# Patient Record
Sex: FEMALE | Race: WHITE | NOT HISPANIC OR LATINO | URBAN - METROPOLITAN AREA
[De-identification: names, ages, dates, MRNs, and addresses within clinical notes are randomized per-mention and may not be internally consistent; named-entity substitution may affect disease eponyms.]

---

## 2019-11-06 ENCOUNTER — NEW PATIENT (OUTPATIENT)
Dept: URBAN - METROPOLITAN AREA CLINIC 19 | Facility: CLINIC | Age: 84
End: 2019-11-06

## 2019-11-06 DIAGNOSIS — H25.13: ICD-10-CM

## 2019-11-06 DIAGNOSIS — H43.393: ICD-10-CM

## 2019-11-06 PROCEDURE — 99204 OFFICE O/P NEW MOD 45 MIN: CPT

## 2019-11-06 PROCEDURE — 92134 CPTRZ OPH DX IMG PST SGM RTA: CPT

## 2019-11-06 PROCEDURE — 92225 OPHTHALMOSCOPY (INITIAL): CPT

## 2019-11-06 ASSESSMENT — TONOMETRY
OS_IOP_MMHG: 13
OD_IOP_MMHG: 14

## 2019-11-06 ASSESSMENT — VISUAL ACUITY
OS_SC: 20/30
OS_PH: 20/25
OD_SC: 20/50-1
OD_PH: 20/40-1

## 2022-09-12 ENCOUNTER — NURSE TRIAGE (OUTPATIENT)
Dept: OTHER | Facility: CLINIC | Age: 87
End: 2022-09-12

## 2022-09-12 NOTE — TELEPHONE ENCOUNTER
Received call from Le at [facility] with [employer] Complaint. Subjective: Caller states \"She has been having dizziness and fainting\"     Current Symptoms: 2 episodes of dizziness and falling. Vertigo. Some vomiting. Dropping things and unable to walk x1 time  Had a CT scan and that was normal. Has appt with neuro in 2 days. Getting more unsteady on her feet. Onset: 6 months ago; worsening    Associated Symptoms: reduced activity    Pain Severity: 0/10; ;     Temperature: denies fever     What has been tried:     LMP: NA Pregnant: NA    Recommended disposition: See HCP within 4 Hours (or PCP triage)    Care advice provided, patient verbalizes understanding; denies any other questions or concerns; instructed to call back for any new or worsening symptoms. Patient/Caller agrees with recommended disposition; writer provided warm transfer to [facility] at [facility] for appointment scheduling    Attention Provider: Thank you for allowing me to participate in the care of your patient. The patient was connected to triage in response to information provided to the [facility]. Please do not respond through this encounter as the response is not directed to a shared pool.     Reason for Disposition   Taking a medicine that could cause dizziness (e.g., phenytoin [Dilantin], carbamazepine [Tegretol], primidone [Mysoline])    Protocols used: Dizziness - Vertigo-ADULT-

## 2023-07-31 ENCOUNTER — HOSPITAL ENCOUNTER (OUTPATIENT)
Facility: HOSPITAL | Age: 88
Discharge: HOME | End: 2023-08-03
Attending: SPECIALIST
Payer: MEDICARE

## 2023-07-31 VITALS — WEIGHT: 129 LBS

## 2023-07-31 DIAGNOSIS — H81.01 MENIERE'S DISEASE OF RIGHT EAR: ICD-10-CM

## 2023-07-31 DIAGNOSIS — H90.3 SENSORINEURAL HEARING LOSS, BILATERAL: ICD-10-CM

## 2023-07-31 DIAGNOSIS — H93.13 TINNITUS, BILATERAL: ICD-10-CM

## 2023-07-31 PROCEDURE — A9579 GAD-BASE MR CONTRAST NOS,1ML: HCPCS | Performed by: RADIOLOGY

## 2023-07-31 PROCEDURE — 6360000004 HC RX CONTRAST MEDICATION: Performed by: RADIOLOGY

## 2023-07-31 PROCEDURE — 70553 MRI BRAIN STEM W/O & W/DYE: CPT

## 2023-07-31 RX ADMIN — GADOTERIDOL 11 ML: 279.3 INJECTION, SOLUTION INTRAVENOUS at 15:01

## 2024-03-18 ENCOUNTER — OFFICE VISIT (OUTPATIENT)
Age: 89
End: 2024-03-18

## 2024-03-18 VITALS
TEMPERATURE: 97.9 F | DIASTOLIC BLOOD PRESSURE: 68 MMHG | WEIGHT: 125 LBS | RESPIRATION RATE: 18 BRPM | HEART RATE: 69 BPM | BODY MASS INDEX: 21.34 KG/M2 | HEIGHT: 64 IN | OXYGEN SATURATION: 96 % | SYSTOLIC BLOOD PRESSURE: 182 MMHG

## 2024-03-18 DIAGNOSIS — B96.89 ACUTE BACTERIAL SINUSITIS: Primary | ICD-10-CM

## 2024-03-18 DIAGNOSIS — R03.0 ELEVATED BLOOD PRESSURE READING: ICD-10-CM

## 2024-03-18 DIAGNOSIS — J20.9 ACUTE BRONCHITIS, UNSPECIFIED ORGANISM: ICD-10-CM

## 2024-03-18 DIAGNOSIS — R09.89 CHEST CONGESTION: ICD-10-CM

## 2024-03-18 DIAGNOSIS — J01.90 ACUTE BACTERIAL SINUSITIS: Primary | ICD-10-CM

## 2024-03-18 DIAGNOSIS — R53.1 WEAKNESS GENERALIZED: ICD-10-CM

## 2024-03-18 LAB
Lab: NORMAL
PERFORMING INSTRUMENT: NORMAL
QC PASS/FAIL: NORMAL
SARS-COV-2, POC: NORMAL

## 2024-03-18 RX ORDER — METHYLPREDNISOLONE 4 MG/1
4 TABLET ORAL SEE ADMIN INSTRUCTIONS
Qty: 1 KIT | Refills: 0 | Status: SHIPPED | OUTPATIENT
Start: 2024-03-18

## 2024-03-18 RX ORDER — PHENOL 1.4 %
600 AEROSOL, SPRAY (ML) MUCOUS MEMBRANE DAILY
COMMUNITY

## 2024-03-18 RX ORDER — CARBAMAZEPINE 200 MG/1
1 TABLET ORAL 3 TIMES DAILY
COMMUNITY

## 2024-03-18 RX ORDER — AMLODIPINE BESYLATE 5 MG/1
TABLET ORAL
COMMUNITY
Start: 2022-12-19

## 2024-03-18 RX ORDER — SPIRONOLACTONE 25 MG/1
TABLET ORAL
COMMUNITY
Start: 2023-02-02

## 2024-03-18 RX ORDER — AZITHROMYCIN 250 MG/1
TABLET, FILM COATED ORAL
Qty: 6 TABLET | Refills: 0 | Status: SHIPPED | OUTPATIENT
Start: 2024-03-18 | End: 2024-03-28

## 2024-03-18 RX ORDER — LOSARTAN POTASSIUM 100 MG/1
100 TABLET ORAL DAILY
COMMUNITY
Start: 2022-09-25

## 2024-03-18 RX ORDER — BENZONATATE 200 MG/1
200 CAPSULE ORAL 3 TIMES DAILY PRN
Qty: 30 CAPSULE | Refills: 0 | Status: SHIPPED | OUTPATIENT
Start: 2024-03-18 | End: 2024-03-28

## 2024-03-18 RX ORDER — BETAHISTINE HCL 100 %
1 POWDER (GRAM) MISCELLANEOUS 2 TIMES DAILY
COMMUNITY
Start: 2024-01-22

## 2024-03-18 RX ORDER — ERGOCALCIFEROL 1.25 MG/1
CAPSULE ORAL
COMMUNITY
Start: 2022-09-25

## 2024-03-18 ASSESSMENT — ENCOUNTER SYMPTOMS
COUGH: 1
EYES NEGATIVE: 1
RHINORRHEA: 1
GASTROINTESTINAL NEGATIVE: 1
ALLERGIC/IMMUNOLOGIC NEGATIVE: 1

## 2024-03-18 NOTE — PROGRESS NOTES
3/18/2024   Makenzie Zepeda (: 1934) is a 89 y.o. female, New patient, here for evaluation of the following chief complaint(s):  Pneumonia (Hx of double pneumonia thinks it may possibly be pneumonia again from same sxs going on has coughing, congestion, weakness )     ASSESSMENT/PLAN:  Below is the assessment and plan developed based on review of pertinent history, physical exam, labs, studies, and medications.  1. Acute bacterial sinusitis  -     azithromycin (ZITHROMAX) 250 MG tablet; 500mg on day 1 followed by 250mg on days 2 - 5, Disp-6 tablet, R-0Normal  2. Acute bronchitis, unspecified organism  -     azithromycin (ZITHROMAX) 250 MG tablet; 500mg on day 1 followed by 250mg on days 2 - 5, Disp-6 tablet, R-0Normal  -     benzonatate (TESSALON) 200 MG capsule; Take 1 capsule by mouth 3 times daily as needed for Cough, Disp-30 capsule, R-0Normal  3. Weakness generalized  -     POCT COVID-19, Antigen  4. Chest congestion  -     XR CHEST PA/LAT; Future  5. Elevated blood pressure reading         Handout given with care instructions  2. OTC for symptom management. Increase fluid intake, ensure adequate nutritional intake.  3. Follow up with PCP as needed.  4. Go to ED with development of any acute symptoms.     Follow up:  Return in about 4 weeks (around 4/15/2024) for BP Check with medication change.  Follow up immediately for any new, worsening or changes or if symptoms are not improving over the next 5-7 days.     SUBJECTIVE/OBJECTIVE:  HPI     aMkenzie Zepeda is a 89 y.o. female who is accompanied by complains of congestion, post nasal drip, and cough described as productive of yellow sputum for 5 days. She denies a history of chest pain, dizziness, fatigue, fevers, myalgias, nausea, shortness of breath, and vomiting and denies a history of asthma.  Patient denies environmental/ seasonal allergies. Patient denies exposure to smoke / cigarettes.Patient denies exposure to  sick contacts . Patient also noted that

## 2024-03-18 NOTE — PATIENT INSTRUCTIONS
as acetaminophen (Tylenol), ibuprofen (Advil, Motrin),  to reduce fever and relieve body aches. Read and follow all instructions on the label.Use a humidifier in your room. Start OTC non drowsy antihistamine.

## 2024-06-27 ENCOUNTER — HOSPITAL ENCOUNTER (EMERGENCY)
Facility: HOSPITAL | Age: 89
Discharge: HOME | End: 2024-06-27
Attending: EMERGENCY MEDICINE
Payer: MEDICARE

## 2024-06-27 ENCOUNTER — APPOINTMENT (OUTPATIENT)
Facility: HOSPITAL | Age: 89
End: 2024-06-27
Payer: MEDICARE

## 2024-06-27 ENCOUNTER — APPOINTMENT (OUTPATIENT)
Facility: HOSPITAL | Age: 89
End: 2024-06-27
Attending: EMERGENCY MEDICINE
Payer: MEDICARE

## 2024-06-27 VITALS
TEMPERATURE: 97.5 F | SYSTOLIC BLOOD PRESSURE: 143 MMHG | HEIGHT: 64 IN | WEIGHT: 124 LBS | RESPIRATION RATE: 16 BRPM | HEART RATE: 65 BPM | OXYGEN SATURATION: 97 % | BODY MASS INDEX: 21.17 KG/M2 | DIASTOLIC BLOOD PRESSURE: 80 MMHG

## 2024-06-27 DIAGNOSIS — E86.0 DEHYDRATION: Primary | ICD-10-CM

## 2024-06-27 DIAGNOSIS — R11.2 NAUSEA AND VOMITING, UNSPECIFIED VOMITING TYPE: ICD-10-CM

## 2024-06-27 LAB
ALBUMIN SERPL-MCNC: 3.2 G/DL (ref 3.5–5)
ALBUMIN/GLOB SERPL: 1.1 (ref 1.1–2.2)
ALP SERPL-CCNC: 77 U/L (ref 45–117)
ALT SERPL-CCNC: 15 U/L (ref 12–78)
ANION GAP SERPL CALC-SCNC: 7 MMOL/L (ref 5–15)
APPEARANCE UR: CLEAR
AST SERPL-CCNC: 13 U/L (ref 15–37)
BACTERIA URNS QL MICRO: NEGATIVE /HPF
BASOPHILS # BLD: 0 K/UL (ref 0–0.1)
BASOPHILS NFR BLD: 1 % (ref 0–1)
BILIRUB SERPL-MCNC: 0.6 MG/DL (ref 0.2–1)
BILIRUB UR QL: NEGATIVE
BUN SERPL-MCNC: 37 MG/DL (ref 6–20)
BUN/CREAT SERPL: 27 (ref 12–20)
CALCIUM SERPL-MCNC: 8.8 MG/DL (ref 8.5–10.1)
CHLORIDE SERPL-SCNC: 100 MMOL/L (ref 97–108)
CO2 SERPL-SCNC: 23 MMOL/L (ref 21–32)
COLOR UR: NORMAL
CREAT SERPL-MCNC: 1.39 MG/DL (ref 0.55–1.02)
DIFFERENTIAL METHOD BLD: ABNORMAL
EOSINOPHIL # BLD: 0 K/UL (ref 0–0.4)
EOSINOPHIL NFR BLD: 0 % (ref 0–7)
EPITH CASTS URNS QL MICRO: NORMAL /LPF
ERYTHROCYTE [DISTWIDTH] IN BLOOD BY AUTOMATED COUNT: 11.2 % (ref 11.5–14.5)
GLOBULIN SER CALC-MCNC: 2.9 G/DL (ref 2–4)
GLUCOSE SERPL-MCNC: 125 MG/DL (ref 65–100)
GLUCOSE UR STRIP.AUTO-MCNC: NEGATIVE MG/DL
HCT VFR BLD AUTO: 37.8 % (ref 35–47)
HGB BLD-MCNC: 13.2 G/DL (ref 11.5–16)
HGB UR QL STRIP: NEGATIVE
HYALINE CASTS URNS QL MICRO: NORMAL /LPF (ref 0–2)
IMM GRANULOCYTES # BLD AUTO: 0 K/UL (ref 0–0.04)
IMM GRANULOCYTES NFR BLD AUTO: 0 % (ref 0–0.5)
KETONES UR QL STRIP.AUTO: NEGATIVE MG/DL
LEUKOCYTE ESTERASE UR QL STRIP.AUTO: NEGATIVE
LIPASE SERPL-CCNC: 56 U/L (ref 13–75)
LYMPHOCYTES # BLD: 1.2 K/UL (ref 0.8–3.5)
LYMPHOCYTES NFR BLD: 14 % (ref 12–49)
MAGNESIUM SERPL-MCNC: 2.1 MG/DL (ref 1.6–2.4)
MCH RBC QN AUTO: 34.6 PG (ref 26–34)
MCHC RBC AUTO-ENTMCNC: 34.9 G/DL (ref 30–36.5)
MCV RBC AUTO: 99.2 FL (ref 80–99)
MONOCYTES # BLD: 0.6 K/UL (ref 0–1)
MONOCYTES NFR BLD: 7 % (ref 5–13)
NEUTS SEG # BLD: 6.9 K/UL (ref 1.8–8)
NEUTS SEG NFR BLD: 78 % (ref 32–75)
NITRITE UR QL STRIP.AUTO: NEGATIVE
NRBC # BLD: 0 K/UL (ref 0–0.01)
NRBC BLD-RTO: 0 PER 100 WBC
PH UR STRIP: 5 (ref 5–8)
PLATELET # BLD AUTO: 218 K/UL (ref 150–400)
PMV BLD AUTO: 10.2 FL (ref 8.9–12.9)
POTASSIUM SERPL-SCNC: 3.8 MMOL/L (ref 3.5–5.1)
PROT SERPL-MCNC: 6.1 G/DL (ref 6.4–8.2)
PROT UR STRIP-MCNC: NEGATIVE MG/DL
RBC # BLD AUTO: 3.81 M/UL (ref 3.8–5.2)
RBC #/AREA URNS HPF: NORMAL /HPF (ref 0–5)
SODIUM SERPL-SCNC: 130 MMOL/L (ref 136–145)
SP GR UR REFRACTOMETRY: 1.01 (ref 1–1.03)
UROBILINOGEN UR QL STRIP.AUTO: 0.2 EU/DL (ref 0.2–1)
WBC # BLD AUTO: 8.8 K/UL (ref 3.6–11)
WBC URNS QL MICRO: NORMAL /HPF (ref 0–4)

## 2024-06-27 PROCEDURE — 81001 URINALYSIS AUTO W/SCOPE: CPT

## 2024-06-27 PROCEDURE — 6360000002 HC RX W HCPCS: Performed by: EMERGENCY MEDICINE

## 2024-06-27 PROCEDURE — 96374 THER/PROPH/DIAG INJ IV PUSH: CPT

## 2024-06-27 PROCEDURE — 36415 COLL VENOUS BLD VENIPUNCTURE: CPT

## 2024-06-27 PROCEDURE — 71250 CT THORAX DX C-: CPT

## 2024-06-27 PROCEDURE — 80053 COMPREHEN METABOLIC PANEL: CPT

## 2024-06-27 PROCEDURE — 99285 EMERGENCY DEPT VISIT HI MDM: CPT

## 2024-06-27 PROCEDURE — 85025 COMPLETE CBC W/AUTO DIFF WBC: CPT

## 2024-06-27 PROCEDURE — 71045 X-RAY EXAM CHEST 1 VIEW: CPT

## 2024-06-27 PROCEDURE — 2580000003 HC RX 258: Performed by: EMERGENCY MEDICINE

## 2024-06-27 PROCEDURE — 96361 HYDRATE IV INFUSION ADD-ON: CPT

## 2024-06-27 PROCEDURE — 93005 ELECTROCARDIOGRAM TRACING: CPT | Performed by: EMERGENCY MEDICINE

## 2024-06-27 PROCEDURE — 83735 ASSAY OF MAGNESIUM: CPT

## 2024-06-27 PROCEDURE — 83690 ASSAY OF LIPASE: CPT

## 2024-06-27 RX ORDER — ONDANSETRON 2 MG/ML
4 INJECTION INTRAMUSCULAR; INTRAVENOUS
Status: COMPLETED | OUTPATIENT
Start: 2024-06-27 | End: 2024-06-27

## 2024-06-27 RX ORDER — ONDANSETRON 4 MG/1
4 TABLET, ORALLY DISINTEGRATING ORAL 3 TIMES DAILY PRN
Qty: 21 TABLET | Refills: 0 | Status: SHIPPED | OUTPATIENT
Start: 2024-06-27

## 2024-06-27 RX ORDER — 0.9 % SODIUM CHLORIDE 0.9 %
1000 INTRAVENOUS SOLUTION INTRAVENOUS ONCE
Status: COMPLETED | OUTPATIENT
Start: 2024-06-27 | End: 2024-06-27

## 2024-06-27 RX ADMIN — SODIUM CHLORIDE 1000 ML: 9 INJECTION, SOLUTION INTRAVENOUS at 18:02

## 2024-06-27 RX ADMIN — ONDANSETRON 4 MG: 2 INJECTION INTRAMUSCULAR; INTRAVENOUS at 18:02

## 2024-06-27 ASSESSMENT — ENCOUNTER SYMPTOMS
COUGH: 0
SORE THROAT: 0
VOMITING: 0

## 2024-06-27 ASSESSMENT — LIFESTYLE VARIABLES
HOW MANY STANDARD DRINKS CONTAINING ALCOHOL DO YOU HAVE ON A TYPICAL DAY: PATIENT DOES NOT DRINK
HOW OFTEN DO YOU HAVE A DRINK CONTAINING ALCOHOL: NEVER

## 2024-06-27 ASSESSMENT — PAIN - FUNCTIONAL ASSESSMENT: PAIN_FUNCTIONAL_ASSESSMENT: 0-10

## 2024-06-27 ASSESSMENT — PAIN SCALES - GENERAL: PAINLEVEL_OUTOF10: 0

## 2024-06-27 NOTE — ED TRIAGE NOTES
Pt arrives to ED via EMS from [facility] where pt reports vomiting x4 days (twice today) and generalized weakness.  Pt denies diarrhea, cough, CP, SOB, fevers.  Pt has been eating and drinking, but is unable to keep \"anything down\".  Pt denies pain.

## 2024-06-28 LAB
EKG ATRIAL RATE: 67 BPM
EKG DIAGNOSIS: NORMAL
EKG P AXIS: 49 DEGREES
EKG P-R INTERVAL: 192 MS
EKG Q-T INTERVAL: 394 MS
EKG QRS DURATION: 98 MS
EKG QTC CALCULATION (BAZETT): 416 MS
EKG R AXIS: 3 DEGREES
EKG T AXIS: 72 DEGREES
EKG VENTRICULAR RATE: 67 BPM

## 2024-06-28 PROCEDURE — 93010 ELECTROCARDIOGRAM REPORT: CPT | Performed by: INTERNAL MEDICINE

## 2024-06-28 NOTE — ED PROVIDER NOTES
[facility] EMERGENCY DEPT  EMERGENCY DEPARTMENT ENCOUNTER      Pt Name: Makenzie Zepeda  MRN: [id number]  Birthdate 12/13/1934  Date of evaluation: 6/27/2024  Provider: Joni Viera MD    CHIEF COMPLAINT       Chief Complaint   Patient presents with    Emesis    Generalized Body Aches         HISTORY OF PRESENT ILLNESS   (Location/Symptom, Timing/Onset, Context/Setting, Quality, Duration, Modifying Factors, Severity)  Note limiting factors.   Patient arrives via EMS from [facility] with reports of vomiting for the past 4 days.  Does report some generalized weakness.  I spoke to the patient's daughter who is also at the bedside who states she was recently treated for an upper respiratory infection with antibiotics but has not seemed to bounce back as she normally would.  Has had no appetite and decreased oral intake over the past few days.  No fever or urinary symptoms.    The history is provided by the patient and the EMS personnel.         Review of External Medical Records:     Nursing Notes were reviewed.    REVIEW OF SYSTEMS    (2-9 systems for level 4, 10 or more for level 5)     Review of Systems   Constitutional:  Negative for fatigue.   HENT:  Negative for sore throat.    Eyes:  Negative for visual disturbance.   Respiratory:  Negative for cough.    Cardiovascular:  Negative for palpitations.   Gastrointestinal:  Negative for vomiting.   Genitourinary:  Negative for difficulty urinating.   Musculoskeletal:  Negative for myalgias.   Skin:  Negative for rash.   Neurological:  Negative for weakness.       Except as noted above the remainder of the review of systems was reviewed and negative.       PAST MEDICAL HISTORY   No past medical history on file.      SURGICAL HISTORY     No past surgical history on file.      CURRENT MEDICATIONS       Previous Medications    AMLODIPINE (NORVASC) 5 MG TABLET        BETAHISTINE HCL (BETAHISTINE DIHYDROCHLORIDE) POWD    Take 1 capsule by mouth 2 times daily    CALCIUM  CARBONATE 600 MG TABS TABLET    Take 1 tablet by mouth daily    CARBAMAZEPINE (TEGRETOL) 200 MG TABLET    Take 1 tablet by mouth 3 times daily    CYANOCOBALAMIN 50 MCG TABLET    Take 1 tablet by mouth daily    LOSARTAN (COZAAR) 100 MG TABLET    Take 1 tablet by mouth daily    METHYLPREDNISOLONE (MEDROL, SHIRIN,) 4 MG TABLET    Take 1 tablet by mouth See Admin Instructions Take by mouth.    SPIRONOLACTONE (ALDACTONE) 25 MG TABLET        VITAMIN D (ERGOCALCIFEROL) 1.25 MG (24347 UT) CAPS CAPSULE    TAKE 1 CAPSULE BY MOUTH WEEKLY INDICATIONS: VITAMIN D DEFICIENCY       ALLERGIES     Nitrofurantoin, Lamotrigine, and Penicillins    FAMILY HISTORY     No family history on file.       SOCIAL HISTORY       Social History     Socioeconomic History    Marital status: Single   Tobacco Use    Smoking status: Never    Smokeless tobacco: Never           PHYSICAL EXAM    (up to 7 for level 4, 8 or more for level 5)     ED Triage Vitals [06/27/24 1732]   BP Temp Temp Source Pulse Respirations SpO2 Height Weight - Scale   (!) 143/80 97.5 °F (36.4 °C) Oral 65 16 97 % 1.626 m (5' 4\") 56.2 kg (124 lb)       Body mass index is 21.28 kg/m².    Physical Exam  HENT:      Head: Normocephalic.      Mouth/Throat:      Mouth: Mucous membranes are moist.   Eyes:      General: No scleral icterus.  Cardiovascular:      Rate and Rhythm: Normal rate.   Pulmonary:      Effort: Pulmonary effort is normal.   Abdominal:      General: There is no distension.   Musculoskeletal:         General: No deformity.      Cervical back: Neck supple.   Skin:     Findings: No rash.   Neurological:      General: No focal deficit present.      Mental Status: She is alert and oriented to person, place, and time.         DIAGNOSTIC RESULTS     EKG: All EKG's are interpreted by the Emergency Department Physician who either signs or Co-signs this chart in the absence of a cardiologist.        RADIOLOGY:   Non-plain film images such as CT, Ultrasound and MRI are read by the

## 2025-03-06 ENCOUNTER — APPOINTMENT (OUTPATIENT)
Facility: HOSPITAL | Age: 89
DRG: 193 | End: 2025-03-06
Payer: MEDICARE

## 2025-03-06 ENCOUNTER — HOSPITAL ENCOUNTER (INPATIENT)
Facility: HOSPITAL | Age: 89
LOS: 6 days | Discharge: NURSING, REHAB OR LONG-TERM CARE | DRG: 193 | End: 2025-03-12
Attending: EMERGENCY MEDICINE | Admitting: FAMILY MEDICINE
Payer: MEDICARE

## 2025-03-06 DIAGNOSIS — J96.01 ACUTE HYPOXIC RESPIRATORY FAILURE: Primary | ICD-10-CM

## 2025-03-06 PROBLEM — J15.8 PNEUMONIA DUE TO OTHER SPECIFIED BACTERIA (HCC): Status: ACTIVE | Noted: 2025-03-06

## 2025-03-06 LAB
ALBUMIN SERPL-MCNC: 3.7 G/DL (ref 3.5–5)
ALBUMIN/GLOB SERPL: 1.2 (ref 1.1–2.2)
ALP SERPL-CCNC: 36 U/L (ref 45–117)
ALT SERPL-CCNC: 19 U/L (ref 12–78)
ANION GAP SERPL CALC-SCNC: 8 MMOL/L (ref 2–12)
APPEARANCE UR: CLEAR
AST SERPL-CCNC: 17 U/L (ref 15–37)
BACTERIA URNS QL MICRO: NEGATIVE /HPF
BASOPHILS # BLD: 0 K/UL (ref 0–0.1)
BASOPHILS NFR BLD: 0 % (ref 0–1)
BILIRUB SERPL-MCNC: 0.7 MG/DL (ref 0.2–1)
BILIRUB UR QL CFM: POSITIVE
BUN SERPL-MCNC: 22 MG/DL (ref 6–20)
BUN/CREAT SERPL: 16 (ref 12–20)
CALCIUM SERPL-MCNC: 9.5 MG/DL (ref 8.5–10.1)
CHLORIDE SERPL-SCNC: 92 MMOL/L (ref 97–108)
CO2 SERPL-SCNC: 27 MMOL/L (ref 21–32)
COLOR UR: ABNORMAL
COMMENT:: NORMAL
CREAT SERPL-MCNC: 1.37 MG/DL (ref 0.55–1.02)
DIFFERENTIAL METHOD BLD: ABNORMAL
EOSINOPHIL # BLD: 0 K/UL (ref 0–0.4)
EOSINOPHIL NFR BLD: 0 % (ref 0–7)
EPITH CASTS URNS QL MICRO: ABNORMAL /LPF
ERYTHROCYTE [DISTWIDTH] IN BLOOD BY AUTOMATED COUNT: 11.6 % (ref 11.5–14.5)
FLUAV RNA SPEC QL NAA+PROBE: NOT DETECTED
FLUBV RNA SPEC QL NAA+PROBE: NOT DETECTED
GLOBULIN SER CALC-MCNC: 3.2 G/DL (ref 2–4)
GLUCOSE SERPL-MCNC: 132 MG/DL (ref 65–100)
GLUCOSE UR STRIP.AUTO-MCNC: NEGATIVE MG/DL
HCT VFR BLD AUTO: 35.4 % (ref 35–47)
HGB BLD-MCNC: 12.9 G/DL (ref 11.5–16)
HGB UR QL STRIP: NEGATIVE
HYALINE CASTS URNS QL MICRO: ABNORMAL /LPF (ref 0–2)
IMM GRANULOCYTES # BLD AUTO: 0 K/UL
IMM GRANULOCYTES NFR BLD AUTO: 0 %
KETONES UR QL STRIP.AUTO: ABNORMAL MG/DL
LEUKOCYTE ESTERASE UR QL STRIP.AUTO: ABNORMAL
LYMPHOCYTES # BLD: 0.8 K/UL (ref 0.8–3.5)
LYMPHOCYTES NFR BLD: 8 % (ref 12–49)
MCH RBC QN AUTO: 36.2 PG (ref 26–34)
MCHC RBC AUTO-ENTMCNC: 36.4 G/DL (ref 30–36.5)
MCV RBC AUTO: 99.4 FL (ref 80–99)
METAMYELOCYTES NFR BLD MANUAL: 1 %
MONOCYTES # BLD: 0.6 K/UL (ref 0–1)
MONOCYTES NFR BLD: 6 % (ref 5–13)
MYELOCYTES NFR BLD MANUAL: 1 %
NEUTS BAND NFR BLD MANUAL: 15 % (ref 0–6)
NEUTS SEG # BLD: 8.4 K/UL (ref 1.8–8)
NEUTS SEG NFR BLD: 69 % (ref 32–75)
NITRITE UR QL STRIP.AUTO: NEGATIVE
NRBC # BLD: 0 K/UL (ref 0–0.01)
NRBC BLD-RTO: 0 PER 100 WBC
NT PRO BNP: 1561 PG/ML
PH UR STRIP: 5 (ref 5–8)
PLATELET # BLD AUTO: 268 K/UL (ref 150–400)
PMV BLD AUTO: 9.5 FL (ref 8.9–12.9)
POTASSIUM SERPL-SCNC: 4.2 MMOL/L (ref 3.5–5.1)
PROT SERPL-MCNC: 6.9 G/DL (ref 6.4–8.2)
PROT UR STRIP-MCNC: 30 MG/DL
RBC # BLD AUTO: 3.56 M/UL (ref 3.8–5.2)
RBC #/AREA URNS HPF: ABNORMAL /HPF (ref 0–5)
RBC MORPH BLD: ABNORMAL
SARS-COV-2 RNA RESP QL NAA+PROBE: NOT DETECTED
SODIUM SERPL-SCNC: 127 MMOL/L (ref 136–145)
SOURCE: NORMAL
SP GR UR REFRACTOMETRY: 1.02 (ref 1–1.03)
SPECIMEN HOLD: NORMAL
TROPONIN I SERPL HS-MCNC: 8 NG/L (ref 0–51)
URINE CULTURE IF INDICATED: ABNORMAL
UROBILINOGEN UR QL STRIP.AUTO: 0.2 EU/DL (ref 0.2–1)
WBC # BLD AUTO: 10 K/UL (ref 3.6–11)
WBC URNS QL MICRO: ABNORMAL /HPF (ref 0–4)

## 2025-03-06 PROCEDURE — 6360000004 HC RX CONTRAST MEDICATION: Performed by: RADIOLOGY

## 2025-03-06 PROCEDURE — 6360000002 HC RX W HCPCS: Performed by: FAMILY MEDICINE

## 2025-03-06 PROCEDURE — 71275 CT ANGIOGRAPHY CHEST: CPT

## 2025-03-06 PROCEDURE — 83880 ASSAY OF NATRIURETIC PEPTIDE: CPT

## 2025-03-06 PROCEDURE — 81001 URINALYSIS AUTO W/SCOPE: CPT

## 2025-03-06 PROCEDURE — 2580000003 HC RX 258: Performed by: EMERGENCY MEDICINE

## 2025-03-06 PROCEDURE — 2580000003 HC RX 258

## 2025-03-06 PROCEDURE — 93005 ELECTROCARDIOGRAM TRACING: CPT

## 2025-03-06 PROCEDURE — 70450 CT HEAD/BRAIN W/O DYE: CPT

## 2025-03-06 PROCEDURE — 1100000000 HC RM PRIVATE

## 2025-03-06 PROCEDURE — 2500000003 HC RX 250 WO HCPCS: Performed by: EMERGENCY MEDICINE

## 2025-03-06 PROCEDURE — 36415 COLL VENOUS BLD VENIPUNCTURE: CPT

## 2025-03-06 PROCEDURE — 87449 NOS EACH ORGANISM AG IA: CPT

## 2025-03-06 PROCEDURE — 73030 X-RAY EXAM OF SHOULDER: CPT

## 2025-03-06 PROCEDURE — 2580000003 HC RX 258: Performed by: FAMILY MEDICINE

## 2025-03-06 PROCEDURE — 84484 ASSAY OF TROPONIN QUANT: CPT

## 2025-03-06 PROCEDURE — 80053 COMPREHEN METABOLIC PANEL: CPT

## 2025-03-06 PROCEDURE — 87636 SARSCOV2 & INF A&B AMP PRB: CPT

## 2025-03-06 PROCEDURE — 6360000002 HC RX W HCPCS: Performed by: EMERGENCY MEDICINE

## 2025-03-06 PROCEDURE — 71045 X-RAY EXAM CHEST 1 VIEW: CPT

## 2025-03-06 PROCEDURE — 99285 EMERGENCY DEPT VISIT HI MDM: CPT

## 2025-03-06 PROCEDURE — 85025 COMPLETE CBC W/AUTO DIFF WBC: CPT

## 2025-03-06 RX ORDER — SODIUM CHLORIDE 0.9 % (FLUSH) 0.9 %
5-40 SYRINGE (ML) INJECTION EVERY 12 HOURS SCHEDULED
Status: DISCONTINUED | OUTPATIENT
Start: 2025-03-06 | End: 2025-03-12 | Stop reason: HOSPADM

## 2025-03-06 RX ORDER — AMLODIPINE BESYLATE 5 MG/1
10 TABLET ORAL DAILY
Status: DISCONTINUED | OUTPATIENT
Start: 2025-03-07 | End: 2025-03-12 | Stop reason: HOSPADM

## 2025-03-06 RX ORDER — HYDRALAZINE HYDROCHLORIDE 25 MG/1
25 TABLET, FILM COATED ORAL EVERY 8 HOURS SCHEDULED
Status: DISCONTINUED | OUTPATIENT
Start: 2025-03-06 | End: 2025-03-12 | Stop reason: HOSPADM

## 2025-03-06 RX ORDER — HYDRALAZINE HYDROCHLORIDE 20 MG/ML
10 INJECTION INTRAMUSCULAR; INTRAVENOUS EVERY 6 HOURS PRN
Status: DISCONTINUED | OUTPATIENT
Start: 2025-03-06 | End: 2025-03-06

## 2025-03-06 RX ORDER — HYDRALAZINE HYDROCHLORIDE 25 MG/1
25 TABLET, FILM COATED ORAL EVERY 8 HOURS SCHEDULED
Status: DISCONTINUED | OUTPATIENT
Start: 2025-03-06 | End: 2025-03-06

## 2025-03-06 RX ORDER — ENOXAPARIN SODIUM 100 MG/ML
30 INJECTION SUBCUTANEOUS EVERY 24 HOURS
Status: DISCONTINUED | OUTPATIENT
Start: 2025-03-06 | End: 2025-03-07

## 2025-03-06 RX ORDER — HYDRALAZINE HYDROCHLORIDE 25 MG/1
25 TABLET, FILM COATED ORAL 2 TIMES DAILY
COMMUNITY
Start: 2025-02-10

## 2025-03-06 RX ORDER — LOSARTAN POTASSIUM 50 MG/1
100 TABLET ORAL DAILY
Status: DISCONTINUED | OUTPATIENT
Start: 2025-03-06 | End: 2025-03-06

## 2025-03-06 RX ORDER — ACETAMINOPHEN 650 MG/1
650 SUPPOSITORY RECTAL EVERY 6 HOURS PRN
Status: DISCONTINUED | OUTPATIENT
Start: 2025-03-06 | End: 2025-03-12 | Stop reason: HOSPADM

## 2025-03-06 RX ORDER — BACLOFEN 5 MG/1
5 TABLET ORAL DAILY
COMMUNITY
Start: 2025-02-16

## 2025-03-06 RX ORDER — SODIUM CHLORIDE 9 MG/ML
INJECTION, SOLUTION INTRAVENOUS CONTINUOUS
Status: DISCONTINUED | OUTPATIENT
Start: 2025-03-06 | End: 2025-03-07

## 2025-03-06 RX ORDER — SODIUM CHLORIDE 0.9 % (FLUSH) 0.9 %
5-40 SYRINGE (ML) INJECTION PRN
Status: DISCONTINUED | OUTPATIENT
Start: 2025-03-06 | End: 2025-03-12 | Stop reason: HOSPADM

## 2025-03-06 RX ORDER — ONDANSETRON 2 MG/ML
4 INJECTION INTRAMUSCULAR; INTRAVENOUS EVERY 6 HOURS PRN
Status: DISCONTINUED | OUTPATIENT
Start: 2025-03-06 | End: 2025-03-12 | Stop reason: HOSPADM

## 2025-03-06 RX ORDER — SODIUM CHLORIDE 9 MG/ML
INJECTION, SOLUTION INTRAVENOUS PRN
Status: DISCONTINUED | OUTPATIENT
Start: 2025-03-06 | End: 2025-03-12 | Stop reason: HOSPADM

## 2025-03-06 RX ORDER — LOSARTAN POTASSIUM 50 MG/1
100 TABLET ORAL DAILY
Status: DISCONTINUED | OUTPATIENT
Start: 2025-03-07 | End: 2025-03-12 | Stop reason: HOSPADM

## 2025-03-06 RX ORDER — AMLODIPINE BESYLATE 5 MG/1
10 TABLET ORAL DAILY
Status: DISCONTINUED | OUTPATIENT
Start: 2025-03-06 | End: 2025-03-06

## 2025-03-06 RX ORDER — IPRATROPIUM BROMIDE AND ALBUTEROL SULFATE 2.5; .5 MG/3ML; MG/3ML
1 SOLUTION RESPIRATORY (INHALATION) EVERY 4 HOURS PRN
Status: DISCONTINUED | OUTPATIENT
Start: 2025-03-06 | End: 2025-03-12 | Stop reason: HOSPADM

## 2025-03-06 RX ORDER — DOXYCYCLINE HYCLATE 100 MG
100 TABLET ORAL EVERY 12 HOURS SCHEDULED
Status: DISCONTINUED | OUTPATIENT
Start: 2025-03-07 | End: 2025-03-11

## 2025-03-06 RX ORDER — 0.9 % SODIUM CHLORIDE 0.9 %
1000 INTRAVENOUS SOLUTION INTRAVENOUS ONCE
Status: DISCONTINUED | OUTPATIENT
Start: 2025-03-06 | End: 2025-03-06

## 2025-03-06 RX ORDER — SERTRALINE HYDROCHLORIDE 25 MG/1
25 TABLET, FILM COATED ORAL DAILY
COMMUNITY
Start: 2025-01-03

## 2025-03-06 RX ORDER — POLYETHYLENE GLYCOL 3350 17 G/17G
17 POWDER, FOR SOLUTION ORAL DAILY PRN
Status: DISCONTINUED | OUTPATIENT
Start: 2025-03-06 | End: 2025-03-12 | Stop reason: HOSPADM

## 2025-03-06 RX ORDER — SPIRONOLACTONE 25 MG/1
50 TABLET ORAL DAILY
Status: DISCONTINUED | OUTPATIENT
Start: 2025-03-06 | End: 2025-03-06

## 2025-03-06 RX ORDER — ONDANSETRON 4 MG/1
4 TABLET, ORALLY DISINTEGRATING ORAL EVERY 8 HOURS PRN
Status: DISCONTINUED | OUTPATIENT
Start: 2025-03-06 | End: 2025-03-12 | Stop reason: HOSPADM

## 2025-03-06 RX ORDER — HYDRALAZINE HYDROCHLORIDE 20 MG/ML
10 INJECTION INTRAMUSCULAR; INTRAVENOUS EVERY 6 HOURS PRN
Status: DISCONTINUED | OUTPATIENT
Start: 2025-03-06 | End: 2025-03-12 | Stop reason: HOSPADM

## 2025-03-06 RX ORDER — ACETAMINOPHEN 325 MG/1
650 TABLET ORAL EVERY 6 HOURS PRN
Status: DISCONTINUED | OUTPATIENT
Start: 2025-03-06 | End: 2025-03-12 | Stop reason: HOSPADM

## 2025-03-06 RX ORDER — IOPAMIDOL 755 MG/ML
100 INJECTION, SOLUTION INTRAVASCULAR
Status: COMPLETED | OUTPATIENT
Start: 2025-03-06 | End: 2025-03-06

## 2025-03-06 RX ORDER — HYDROCHLOROTHIAZIDE 12.5 MG/1
12.5 TABLET ORAL DAILY
Status: ON HOLD | COMMUNITY
Start: 2025-02-08 | End: 2025-03-12 | Stop reason: HOSPADM

## 2025-03-06 RX ADMIN — ONDANSETRON 4 MG: 2 INJECTION, SOLUTION INTRAMUSCULAR; INTRAVENOUS at 17:43

## 2025-03-06 RX ADMIN — WATER 1000 MG: 1 INJECTION INTRAMUSCULAR; INTRAVENOUS; SUBCUTANEOUS at 15:50

## 2025-03-06 RX ADMIN — DOXYCYCLINE 100 MG: 100 INJECTION, POWDER, LYOPHILIZED, FOR SOLUTION INTRAVENOUS at 16:02

## 2025-03-06 RX ADMIN — SODIUM CHLORIDE: 0.9 INJECTION, SOLUTION INTRAVENOUS at 17:21

## 2025-03-06 RX ADMIN — SODIUM CHLORIDE 1000 ML: 0.9 INJECTION, SOLUTION INTRAVENOUS at 10:10

## 2025-03-06 RX ADMIN — METHYLPREDNISOLONE SODIUM SUCCINATE 60 MG: 125 INJECTION, POWDER, LYOPHILIZED, FOR SOLUTION INTRAMUSCULAR; INTRAVENOUS at 15:51

## 2025-03-06 RX ADMIN — IOPAMIDOL 65 ML: 755 INJECTION, SOLUTION INTRAVENOUS at 13:37

## 2025-03-06 RX ADMIN — HYDRALAZINE HYDROCHLORIDE 10 MG: 20 INJECTION INTRAMUSCULAR; INTRAVENOUS at 17:53

## 2025-03-06 RX ADMIN — ENOXAPARIN SODIUM 30 MG: 100 INJECTION SUBCUTANEOUS at 21:33

## 2025-03-06 ASSESSMENT — PAIN - FUNCTIONAL ASSESSMENT: PAIN_FUNCTIONAL_ASSESSMENT: NONE - DENIES PAIN

## 2025-03-06 NOTE — ACP (ADVANCE CARE PLANNING)
Advance Care Planning     Advance Care Planning (ACP) Physician/NP/PA Conversation    Date of Conversation: 3/6/2025  Conducted with: Legal next of kin  Other persons present: Daughter      Healthcare Decision Maker: No healthcare decision makers have been documented.         Care Preferences:    Hospitalization:  \"If your health worsens and it becomes clear that your chance of recovery is unlikely, what would be your preference regarding hospitalization?\"  The patient would prefer hospitalization.    Ventilation:  \"If you were unable to breath on your own and your chance of recovery was unlikely, what would be your preference about the use of a ventilator (breathing machine) if it was available to you?\"  The patient would NOT desire the use of a ventilator.    Resuscitation:  \"In the event your heart stopped as a result of an underlying serious health condition, would you want attempts made to restart your heart, or would you prefer a natural death?\"  No, do NOT attempt to resuscitate.    benefit/burden of treatment options    Conversation Outcomes / Follow-Up Plan:  ACP complete - no further action today  Reviewed DNR/DNI and patient confirms current DNR status - completed forms on file (place new order if needed)    Length of Voluntary ACP Conversation in minutes:  20 minutes    Faina Gregory MD

## 2025-03-06 NOTE — ED TRIAGE NOTES
Patient arrives via EMS from [facility].     Patient had a GLF on Monday. Reports last night she started with a cough and vomiting. Today she is weaker than normal.     86% on RA with EMS. 94% on 4L NC O2. Not typically on home O2.     Patient reports no complaints.     PMH HTN, Dementia.

## 2025-03-06 NOTE — H&P
[facility]  [address] [place] [address] [phone]    [facility] Adult  Hospitalist Group    History & Physical    Date of service: 3/6/2025    Patient name: Makenzie Zepeda  MRN: [id number]  YOB: 1934  Age: 89 y.o.     Primary care provider:  Joni Roberts MD     Source of Information: patient, medical records                                Chief complain: Fatigue    History of present illness  Makenzie Zepeda is a 89 y.o. female who presented with fatigue.  She apparently fell last Monday, denies any head trauma but is not sure if she lost consciousness.  She has been feeling fatigued and started having a cough with vomiting yesterday.  Cough is productive of brown sputum.  She also reports an episode of emesis yesterday.  Family member at bedside endorsed upper and lower extremity weakness.  She lives at independent living facility.  She was found to have pneumonia on CT imaging.    No past medical history on file.   No past surgical history on file.  Prior to Admission medications    Medication Sig Start Date End Date Taking? Authorizing Provider   ondansetron (ZOFRAN-ODT) 4 MG disintegrating tablet Take 1 tablet by mouth 3 times daily as needed for Nausea or Vomiting 6/27/24   Joni Viera MD   losartan (COZAAR) 100 MG tablet Take 1 tablet by mouth daily 9/25/22   Ana Conway MD   amLODIPine (NORVASC) 5 MG tablet  12/19/22   Ana Conway MD   Betahistine HCl (BETAHISTINE DIHYDROCHLORIDE) POWD Take 1 capsule by mouth 2 times daily 1/22/24   Ana Conway MD   calcium carbonate 600 MG TABS tablet Take 1 tablet by mouth daily    Ana Conway MD   carBAMazepine (TEGRETOL) 200 MG tablet Take 1 tablet by mouth 3 times daily    Ana Conway MD   cyanocobalamin 50 MCG tablet Take 1 tablet by mouth daily    Ana Conway MD   vitamin D (ERGOCALCIFEROL) 1.25 MG (17528 UT) CAPS capsule TAKE 1

## 2025-03-06 NOTE — ED PROVIDER NOTES
Formerly named [facility] EMERGENCY DEPARTMENT  EMERGENCY DEPARTMENT ENCOUNTER      Pt Name: Makenzie Zepeda  MRN: [id number]  Birthdate 12/13/1934  Date of evaluation: 3/6/2025  Provider: Zak Kraft MD    CHIEF COMPLAINT       Chief Complaint   Patient presents with    Fatigue         HISTORY OF PRESENT ILLNESS   (Location/Symptom, Timing/Onset, Context/Setting, Quality, Duration, Modifying Factors, Severity)  Note limiting factors.   89 yo F with PMHx of aspiration pneumonia, memory loss, HTN, presenting with generalized weakness, productive cough, and vomiting. She is relatively independent at baseline, lives in independent living, walks with a walker. She fell on Monday into a desk on her left side, unclear LOC, no head trauma. She started coughing and vomiting yesterday. Cough is productive of brown sputum. Emesis appeared \"like red wine\" which she was reportedly drinking. LKW is unknown. She also has bilateral weakness new from baseline. Daughter showed a video of her raising her arms to fix her hair, and her arms just drop. Daughter says she is not acting like her usual self. Daughter reports she is not oriented to time at baseline. EMS also endorsed bilateral leg weakness, trying to assist her with standing, her legs would both give out. No known sick contacts at her independent living facility. She is a former smoker, quit 3-4 years ago. Temp with EMS 99.3, O2 sats 87% RA, put on 4L NC during transport.    The history is provided by a relative and the EMS personnel. The history is limited by the condition of the patient.         Review of External Medical Records:     Nursing Notes were reviewed.    REVIEW OF SYSTEMS    (2-9 systems for level 4, 10 or more for level 5)     Review of Systems   Constitutional:  Positive for activity change and appetite change. Negative for fever.   HENT: Negative.  Negative for congestion and rhinorrhea.    Respiratory:  Positive for cough. Negative for chest  tightness.    Cardiovascular: Negative.  Negative for chest pain and leg swelling.   Gastrointestinal:  Positive for vomiting. Negative for abdominal pain, constipation and diarrhea.   Neurological:  Positive for weakness. Negative for numbness.   All other systems reviewed and are negative.      Except as noted above the remainder of the review of systems was reviewed and negative.       PAST MEDICAL HISTORY   No past medical history on file.      SURGICAL HISTORY     No past surgical history on file.      CURRENT MEDICATIONS       Previous Medications    AMLODIPINE (NORVASC) 5 MG TABLET        BETAHISTINE HCL (BETAHISTINE DIHYDROCHLORIDE) POWD    Take 1 capsule by mouth 2 times daily    CALCIUM CARBONATE 600 MG TABS TABLET    Take 1 tablet by mouth daily    CARBAMAZEPINE (TEGRETOL) 200 MG TABLET    Take 1 tablet by mouth 3 times daily    CYANOCOBALAMIN 50 MCG TABLET    Take 1 tablet by mouth daily    LOSARTAN (COZAAR) 100 MG TABLET    Take 1 tablet by mouth daily    METHYLPREDNISOLONE (MEDROL, SHIRIN,) 4 MG TABLET    Take 1 tablet by mouth See Admin Instructions Take by mouth.    ONDANSETRON (ZOFRAN-ODT) 4 MG DISINTEGRATING TABLET    Take 1 tablet by mouth 3 times daily as needed for Nausea or Vomiting    SPIRONOLACTONE (ALDACTONE) 25 MG TABLET        VITAMIN D (ERGOCALCIFEROL) 1.25 MG (49036 UT) CAPS CAPSULE    TAKE 1 CAPSULE BY MOUTH WEEKLY INDICATIONS: VITAMIN D DEFICIENCY       ALLERGIES     Nitrofurantoin, Lamotrigine, and Penicillins    FAMILY HISTORY     No family history on file.       SOCIAL HISTORY       Social History     Socioeconomic History    Marital status: Single   Tobacco Use    Smoking status: Never    Smokeless tobacco: Never           PHYSICAL EXAM    (up to 7 for level 4, 8 or more for level 5)     ED Triage Vitals   BP Systolic BP Percentile Diastolic BP Percentile Temp Temp Source Pulse Respirations SpO2   03/06/25 0857 -- -- 03/06/25 0857 03/06/25 0857 03/06/25 0857 03/06/25 0857 03/06/25  Donovan Thao      CT Head W/O Contrast   Final Result   No acute abnormality.            Electronically signed by MALCOLM DE JESUS      XR CHEST PORTABLE   Final Result   Suspected pulmonary edema superimposed on chronic lung disease with   a small left effusion.      Electronically signed by Burke Purcell      CTA CHEST W WO CONTRAST PE Eval    (Results Pending)        LABS:  Labs Reviewed   CBC WITH AUTO DIFFERENTIAL - Abnormal; Notable for the following components:       Result Value    RBC 3.56 (*)     MCV 99.4 (*)     MCH 36.2 (*)     Band Neutrophils 15 (*)     Lymphocytes % 8 (*)     Metamyelocytes 1 (*)     Myelocytes 1 (*)     Neutrophils Absolute 8.40 (*)     All other components within normal limits   COMPREHENSIVE METABOLIC PANEL - Abnormal; Notable for the following components:    Sodium 127 (*)     Chloride 92 (*)     Glucose 132 (*)     BUN 22 (*)     Creatinine 1.37 (*)     Est, Glom Filt Rate 37 (*)     Alk Phosphatase 36 (*)     All other components within normal limits   BRAIN NATRIURETIC PEPTIDE - Abnormal; Notable for the following components:    NT Pro-BNP 1,561 (*)     All other components within normal limits   COVID-19 & INFLUENZA COMBO   EXTRA TUBES HOLD   TROPONIN   URINALYSIS WITH REFLEX TO CULTURE       All other labs were within normal range or not returned as of this dictation.    EMERGENCY DEPARTMENT COURSE and DIFFERENTIAL DIAGNOSIS/MDM:   Vitals:    Vitals:    03/06/25 1115 03/06/25 1130 03/06/25 1200 03/06/25 1215   BP: (!) 155/63 (!) 155/45 (!) 152/47 (!) 168/52   Pulse: 84 82 80 85   Resp: 21 21 19 18   Temp:       TempSrc:       SpO2: 95% 97% 98% 98%   Weight:       Height:               Medical Decision Making  Presenting with cough, generalized weakness. Requiring supplemental O2 1.5L NC. Symptoms not classically c/w stroke. Suspect likely 2/2 URI/PNA, and dehydration. F/u CBC, CMP, BNP, Trop, UA, flu/covid. 1L NS bolus. CXR, L shoulder XR, CT head w/o contrast.    Amount and/or

## 2025-03-06 NOTE — ED NOTES
TRANSFER - OUT REPORT:    Verbal report given to Amy ALBERTO on Makenzie Zepeda  being transferred to 5th floor for routine progression of patient care       Report consisted of patient's Situation, Background, Assessment and   Recommendations(SBAR).     Information from the following report(s) Index, Intake/Output, MAR, Recent Results, and Neuro Assessment was reviewed with the receiving nurse.    [place] Fall Assessment:    Presents to emergency department  because of falls (Syncope, seizure, or loss of consciousness): No  Age > 70: Yes  Altered Mental Status, Intoxication with alcohol or substance confusion (Disorientation, impaired judgment, poor safety awaremess, or inability to follow instructions): Yes  Impaired Mobility: Ambulates or transfers with assistive devices or assistance; Unable to ambulate or transer.: Yes  Nursing Judgement: Yes          Lines:   Peripheral IV 03/06/25 Left;Anterior Forearm (Active)        Opportunity for questions and clarification was provided.      Patient transported with:  O2 @ 2lpm and Tech

## 2025-03-07 LAB
ANION GAP SERPL CALC-SCNC: 11 MMOL/L (ref 2–12)
BASOPHILS # BLD: 0 K/UL (ref 0–0.1)
BASOPHILS NFR BLD: 0 % (ref 0–1)
BUN SERPL-MCNC: 20 MG/DL (ref 6–20)
BUN/CREAT SERPL: 23 (ref 12–20)
CALCIUM SERPL-MCNC: 8.5 MG/DL (ref 8.5–10.1)
CHLORIDE SERPL-SCNC: 98 MMOL/L (ref 97–108)
CO2 SERPL-SCNC: 22 MMOL/L (ref 21–32)
CREAT SERPL-MCNC: 0.88 MG/DL (ref 0.55–1.02)
DIFFERENTIAL METHOD BLD: ABNORMAL
EKG ATRIAL RATE: 78 BPM
EKG DIAGNOSIS: NORMAL
EKG P AXIS: 86 DEGREES
EKG P-R INTERVAL: 204 MS
EKG Q-T INTERVAL: 364 MS
EKG QRS DURATION: 108 MS
EKG QTC CALCULATION (BAZETT): 414 MS
EKG R AXIS: 207 DEGREES
EKG T AXIS: 174 DEGREES
EKG VENTRICULAR RATE: 78 BPM
EOSINOPHIL # BLD: 0 K/UL (ref 0–0.4)
EOSINOPHIL NFR BLD: 0 % (ref 0–7)
ERYTHROCYTE [DISTWIDTH] IN BLOOD BY AUTOMATED COUNT: 11.8 % (ref 11.5–14.5)
GLUCOSE SERPL-MCNC: 105 MG/DL (ref 65–100)
HCT VFR BLD AUTO: 31.8 % (ref 35–47)
HGB BLD-MCNC: 11.5 G/DL (ref 11.5–16)
IMM GRANULOCYTES # BLD AUTO: 0 K/UL
IMM GRANULOCYTES NFR BLD AUTO: 0 %
LYMPHOCYTES # BLD: 1.51 K/UL (ref 0.8–3.5)
LYMPHOCYTES NFR BLD: 12 % (ref 12–49)
MCH RBC QN AUTO: 36.3 PG (ref 26–34)
MCHC RBC AUTO-ENTMCNC: 36.2 G/DL (ref 30–36.5)
MCV RBC AUTO: 100.3 FL (ref 80–99)
MONOCYTES # BLD: 1.01 K/UL (ref 0–1)
MONOCYTES NFR BLD: 8 % (ref 5–13)
NEUTS BAND NFR BLD MANUAL: 19 % (ref 0–6)
NEUTS SEG # BLD: 10.08 K/UL (ref 1.8–8)
NEUTS SEG NFR BLD: 61 % (ref 32–75)
NRBC # BLD: 0 K/UL (ref 0–0.01)
NRBC BLD-RTO: 0 PER 100 WBC
PLATELET # BLD AUTO: 250 K/UL (ref 150–400)
PMV BLD AUTO: 9.9 FL (ref 8.9–12.9)
POTASSIUM SERPL-SCNC: 3.6 MMOL/L (ref 3.5–5.1)
RBC # BLD AUTO: 3.17 M/UL (ref 3.8–5.2)
RBC MORPH BLD: ABNORMAL
SODIUM SERPL-SCNC: 131 MMOL/L (ref 136–145)
WBC # BLD AUTO: 12.6 K/UL (ref 3.6–11)

## 2025-03-07 PROCEDURE — 6360000002 HC RX W HCPCS: Performed by: HOSPITALIST

## 2025-03-07 PROCEDURE — 92610 EVALUATE SWALLOWING FUNCTION: CPT

## 2025-03-07 PROCEDURE — 2500000003 HC RX 250 WO HCPCS: Performed by: HOSPITALIST

## 2025-03-07 PROCEDURE — 2700000000 HC OXYGEN THERAPY PER DAY

## 2025-03-07 PROCEDURE — 97116 GAIT TRAINING THERAPY: CPT

## 2025-03-07 PROCEDURE — 93010 ELECTROCARDIOGRAM REPORT: CPT | Performed by: SPECIALIST

## 2025-03-07 PROCEDURE — 1100000000 HC RM PRIVATE

## 2025-03-07 PROCEDURE — 97161 PT EVAL LOW COMPLEX 20 MIN: CPT

## 2025-03-07 PROCEDURE — 94761 N-INVAS EAR/PLS OXIMETRY MLT: CPT

## 2025-03-07 PROCEDURE — 2500000003 HC RX 250 WO HCPCS: Performed by: FAMILY MEDICINE

## 2025-03-07 PROCEDURE — 80048 BASIC METABOLIC PNL TOTAL CA: CPT

## 2025-03-07 PROCEDURE — 97165 OT EVAL LOW COMPLEX 30 MIN: CPT

## 2025-03-07 PROCEDURE — 6370000000 HC RX 637 (ALT 250 FOR IP): Performed by: FAMILY MEDICINE

## 2025-03-07 PROCEDURE — 2580000003 HC RX 258: Performed by: FAMILY MEDICINE

## 2025-03-07 PROCEDURE — 85025 COMPLETE CBC W/AUTO DIFF WBC: CPT

## 2025-03-07 PROCEDURE — 97535 SELF CARE MNGMENT TRAINING: CPT

## 2025-03-07 RX ORDER — ENOXAPARIN SODIUM 100 MG/ML
40 INJECTION SUBCUTANEOUS EVERY 24 HOURS
Status: DISCONTINUED | OUTPATIENT
Start: 2025-03-07 | End: 2025-03-12 | Stop reason: HOSPADM

## 2025-03-07 RX ADMIN — DOXYCYCLINE HYCLATE 100 MG: 100 TABLET, COATED ORAL at 09:52

## 2025-03-07 RX ADMIN — SERTRALINE 25 MG: 50 TABLET, FILM COATED ORAL at 09:52

## 2025-03-07 RX ADMIN — ENOXAPARIN SODIUM 40 MG: 100 INJECTION SUBCUTANEOUS at 20:50

## 2025-03-07 RX ADMIN — DOXYCYCLINE HYCLATE 100 MG: 100 TABLET, COATED ORAL at 20:50

## 2025-03-07 RX ADMIN — LOSARTAN POTASSIUM 100 MG: 50 TABLET, FILM COATED ORAL at 09:51

## 2025-03-07 RX ADMIN — SODIUM CHLORIDE: 0.9 INJECTION, SOLUTION INTRAVENOUS at 06:49

## 2025-03-07 RX ADMIN — SODIUM CHLORIDE, PRESERVATIVE FREE 10 ML: 5 INJECTION INTRAVENOUS at 09:52

## 2025-03-07 RX ADMIN — AMLODIPINE BESYLATE 10 MG: 5 TABLET ORAL at 09:51

## 2025-03-07 RX ADMIN — SODIUM CHLORIDE, PRESERVATIVE FREE 10 ML: 5 INJECTION INTRAVENOUS at 20:50

## 2025-03-07 RX ADMIN — WATER 1000 MG: 1 INJECTION INTRAMUSCULAR; INTRAVENOUS; SUBCUTANEOUS at 11:40

## 2025-03-07 NOTE — CARE COORDINATION
Care Management Progress Note    Reason for Admission:   Pneumonia due to other specified bacteria (HCC) [J15.8]  Acute hypoxic respiratory failure (HCC) [J96.01]         Patient Admission Status: Inpatient  RUR:  14%  Hospitalization in the last 30 days (Readmission):  No        CM reviewed EMR and pt was discussed in IDRs.    Transition Plan of Care:  PT/OT/ST evals pending; await recs  Outpatient follow up  Mode of transport to be determined    CM will continue to follow pt for definitive discharge plans.  Alyssa

## 2025-03-07 NOTE — PROGRESS NOTES
[facility] Lovenox Dosing 03/07/25  Lovenox dose change per protocol  Physician: Dr. Hernandez     [facility] Protocol  Enoxaparin prophylaxis dosing (medically ill, surgical patients)   Patient Weight (kg)     50 and below 51 - 100 101 - 150 151 - 174 175 or greater         Estimated CrCl  (ml/min) 30 or greater   30 mg SUBQ daily   40 mg SUBQ daily (or 30 mg BID for ortho) 30 mg SUBQ BID  40 mg SUBQ BID 60mg SUBQ BID      15-29 UFH 5000 units SUBQ BID   30 mg SUBQ daily 30 mg SUBQ daily 40 mg SUBQ daily   60 mg SUBQ daily      Less than 15 or Dialysis UFH 5000 units SUBQ BID   UFH 5000 units SUBQ TID UFH 7500 units SUBQ TID     Estimated Creatinine Clearance: 37 mL/min (based on SCr of 0.88 mg/dL).  Current dose: 30mg daily  Recommendation: 40mg daily    Thank you  Abelardo Ayala, [facility]  [phone]

## 2025-03-07 NOTE — PROGRESS NOTES
[facility]NA[facility]  [address] [place] [address] [phone]      Medical Progress Note      NAME: Makenzie Zepeda   :  1934  MRM:  [id number]    Date/Time: 3/7/2025  10:30 AM           Assessment / Plan:   89 years old female who presented with symptoms of fatigue and cough, admitted with bilateral community-acquired pneumonia    Community-acquired pneumonia  Fatigue  CT chest obtained on admission with bilateral upper lobe airspace disease.   Not require supplemental oxygen and on room air.  Currently maintained on doxycycline.  I have also added IV Rocephin.  Given hyponatremia, Legionella antigen pending.  No leukocytosis on presentation, however noted bump this morning; I am suspecting this is likely related to steroids which she received yesterday.  Continue to monitor fever curve.  Check WBC tomorrow.    Hyponatremia  Suspect likely related to prerenal versus SIADH.  Improved with IV fluid hydration.  Home hydrochlorothiazide and spironolactone currently on hold.  Discontinue continuous IV fluids especially given noted elevated BNP.  Continue to encourage oral intake.  Continue to closely monitor.    OPAL, POA  Resolved with IV fluid hydration.  Discontinue IV fluid as above.    Essential hypertension  Blood pressure currently not at goal.  Continue with home amlodipine and losartan for now.  Hydrochlorothiazide and spironolactone on hold as above.    Slurred speech  Initially reported concerns for slurred speech which resulted in CT scan of head being obtained.  No acute pathology noted.  Patient speaking fluently currently.    Memory loss  Alert and oriented to person and place but not time.  Supportive care.  I will reach out to patient's daughter to update her.                 Care Plan discussed with: Patient    Prophylaxis:  Lovenox    Disposition: Potentially discharge on 3/8/2025.           ___________________________________________________    Attending  Physician: Temo Hernandez MD        Subjective:     Chief Complaint: Awake and alert.  Oriented to person and place but not time.  When asked about the year, she tells me she is even confused even when she is well.  She tells me she would like to be discharged today, and I informed her I will be touching base with her daughter with updates: Updated daughter over phone around 2 PM - discussed above assessment and plan with potential disposition in 1 to 2 days.  All other questions answered.       Objective:       Vitals:     Last 24hrs VS reviewed since prior progress note. Most recent are:    Vitals:    03/07/25 0931   BP: (!) 157/68   Pulse: 91   Resp: 17   Temp: 98.6 °F (37 °C)   SpO2: 90%     SpO2 Readings from Last 6 Encounters:   03/07/25 90%   06/27/24 97%   03/18/24 96%          Intake/Output Summary (Last 24 hours) at 3/7/2025 1030  Last data filed at 3/7/2025 0645  Gross per 24 hour   Intake --   Output 200 ml   Net -200 ml          Exam:     Physical Exam:    Gen:  No acute distress. Non toxic appearing.  HEENT:  NC/AT. PERRL.  Neck:  Supple.  Resp:  Unlabored breathing. Clear breath sounds with good air entry. No wheezes rales or rhonchi.  Card:  Regular rate and rhythm. Normal S1 and S2.  Abd:  Soft, non-tender, non-distended, normoactive bowel sounds.  Ext: No clubbing, cyanosis or edema.  Neuro:  Moving all extremities with no gross focal deficits appreciated. Oriented to person and place but not time.    Medications Reviewed: (see below)    Lab Data Reviewed: (see below)  ______________________________________________________________________    Medications:     Current Facility-Administered Medications   Medication Dose Route Frequency    enoxaparin (LOVENOX) injection 40 mg  40 mg SubCUTAneous Q24H    doxycycline hyclate (VIBRA-TABS) tablet 100 mg  100 mg Oral 2 times per day    ipratropium 0.5 mg-albuterol 2.5 mg (DUONEB) nebulizer solution 1 Dose  1 Dose Inhalation Q4H PRN    sodium chloride  flush 0.9 % injection 5-40 mL  5-40 mL IntraVENous 2 times per day    sodium chloride flush 0.9 % injection 5-40 mL  5-40 mL IntraVENous PRN    0.9 % sodium chloride infusion   IntraVENous PRN    ondansetron (ZOFRAN-ODT) disintegrating tablet 4 mg  4 mg Oral Q8H PRN    Or    ondansetron (ZOFRAN) injection 4 mg  4 mg IntraVENous Q6H PRN    polyethylene glycol (GLYCOLAX) packet 17 g  17 g Oral Daily PRN    acetaminophen (TYLENOL) tablet 650 mg  650 mg Oral Q6H PRN    Or    acetaminophen (TYLENOL) suppository 650 mg  650 mg Rectal Q6H PRN    amLODIPine (NORVASC) tablet 10 mg  10 mg Oral Daily    losartan (COZAAR) tablet 100 mg  100 mg Oral Daily    sertraline (ZOLOFT) tablet 25 mg  25 mg Oral Daily    [Held by provider] hydrALAZINE (APRESOLINE) tablet 25 mg  25 mg Oral 3 times per day    hydrALAZINE (APRESOLINE) injection 10 mg  10 mg IntraVENous Q6H PRN        Lab Review:     Recent Labs     03/06/25  1014 03/07/25  0339   WBC 10.0 12.6*   HGB 12.9 11.5   HCT 35.4 31.8*    250     Recent Labs     03/06/25  1014 03/07/25  0339   * 131*   K 4.2 3.6   CL 92* 98   CO2 27 22   BUN 22* 20   ALT 19  --      No components found for: \"GLPOC\"

## 2025-03-07 NOTE — PLAN OF CARE
Problem: Occupational Therapy - Adult  Goal: By Discharge: Performs self-care activities at highest level of function for planned discharge setting.  See evaluation for individualized goals.  Description: FUNCTIONAL STATUS PRIOR TO ADMISSION:  Patient reports independent with ADLs, able to ambulate with no AD, but uses a rolling walker when she can find.  Patient poor historian during assessment.   ,  ,  ,  ,  ,  ,  ,  ,  ,  ,       HOME SUPPORT: Patient lived in Independent living facility.    Occupational Therapy Goals:  Initiated 3/7/2025  1.  Patient will perform lower body dressing with Supervision within 7 day(s).  2.  Patient will perform grooming with Supervision within 7 day(s).  3.  Patient will perform toilet transfers with Supervision  within 7 day(s).  4.  Patient will perform all aspects of toileting with Supervision within 7 day(s).  5.  Patient will participate in upper extremity therapeutic exercise/activities with Supervision for 10 minutes within 7 day(s).    6.  Patient will utilize energy conservation techniques during functional activities with verbal cues within 7 day(s).    Outcome: Progressing   OCCUPATIONAL THERAPY EVALUATION    Patient: Makenzie Zepeda (89 y.o. female)  Date: 3/7/2025  Primary Diagnosis: Pneumonia due to other specified bacteria (HCC) [J15.8]  Acute hypoxic respiratory failure (HCC) [J96.01]         Precautions:                    ASSESSMENT :  The patient is limited by decreased functional mobility, independence in ADLs, strength, activity tolerance, endurance, safety awareness, cognition, attention/concentration, balance, posture following admission for acute hypoxic respiratory failure, PNA with falls at home.  Patient noted with confusion, oriented to self only, believes she lives at this hospital.  Patient follows commands but is easily distracted and limited attention during activity. Cues to refocus. Patient to EOB with min assist.  Noted bed wet from and requires  functional limits         Strength:  Strength: Generally decreased, functional      Coordination:  Coordination: Within functional limits            Tone & Sensation:   Tone: Normal  Sensation: Intact          Functional Mobility and Transfers for ADLs:    Bed Mobility:     Bed Mobility Training  Bed Mobility Training: Yes  Supine to Sit: Minimal assistance  Scooting: Contact guard assistance    Transfers:      Transfer Training  Transfer Training: Yes  Sit to Stand: Minimal assistance  Stand to Sit: Minimal assistance  Bed to Chair: Minimal assistance  Toilet Transfer: Minimal assistance;2 Person assistance                     Balance:      Balance  Sitting: Impaired  Sitting - Static: Fair (occasional)  Sitting - Dynamic: Fair (occasional)  Standing: Impaired  Standing - Static: Constant support  Standing - Dynamic: Constant support;Fair      ADL Assessment:          Feeding: Setup       Grooming: Contact guard assistance       UE Bathing: Contact guard assistance            LE Bathing: Minimal assistance       UE Dressing: Contact guard assistance       LE Dressing: Minimal assistance       Toileting: Minimal assistance                         ADL Intervention and task modifications:          Pain Ratin/10   Pain Intervention(s):       Activity Tolerance:   Good and requires rest breaks    After treatment:   Patient left in no apparent distress sitting up in chair, Call bell within reach, and Bed/ chair alarm activated    COMMUNICATION/EDUCATION:   The patient's plan of care was discussed with: physical therapist and registered nurse         Thank you for this referral.  Karuna Witt OTR/L  Minutes: 21    Occupational Therapy Evaluation Charge Determination   History Examination Decision-Making   LOW Complexity : Brief history review  LOW Complexity: 1-3 Performance deficits relating to physical, cognitive, or psychosocial skills that result in activity limitations and/or participation restrictions LOW

## 2025-03-07 NOTE — PROGRESS NOTES
Speech LAnguage Pathology EVALUATION/DISCHARGE    Patient: Makenzie Zepeda (89 y.o. female)  Date: 3/7/2025  Primary Diagnosis: Pneumonia due to other specified bacteria ([facility]) [J15.8]  Acute hypoxic respiratory failure ([facility]) [J96.01]       Precautions:                     ASSESSMENT :  Chest CT showing bilateral upper lobe airspace disease. Per admission notes, she was admitted with weakness, productive cough and vomiting.   Patient denies dysphagia at baseline. Timely mastication of solids and no overt s/s aspiration with any consistency. Low suspicion for prandial aspiration. Would be more suspicious of a post-prandial aspiration that occurred with reported vomiting and her upper lobe pna.     Patient with clear speech, no dysarthria appreciated. She had occasional word retrieval difficulties that she reports is her baseline. Note history of memory difficulties.     Patient will be discharged from skilled speech-language pathology services at this time.     PLAN :  Recommendations and Planned Interventions:  Diet: regular diet/ thin liquids. Straw ok  Meds as tolerated    Acute SLP Services: No, patient will be discharged from acute skilled speech-language pathology at this time.  Discharge Recommendations: No, additional SLP treatment not indicated at discharge     SUBJECTIVE:   Patient stated, “I just want to sleep”    OBJECTIVE:   No past medical history on file.No past surgical history on file.  Prior Level of Function/Home Situation:        Baseline Assessment:  Cognitive and Communication Status:  Neurologic State: Alert  Orientation Level: Oriented to person and Oriented to place  Cognition: Follows commands    Dysphagia:  Oral Assessment:  Oral Motor   Labial: No impairment  Dentition: Natural  Oral Hygiene: Moist  Oral Hygiene Comments: clean, moist  Lingual: No impairment  Velum: Unable to visualize  Mandible: No impairment  P.O. Trials:  PO Trials  Neuromuscular Estim Used: No  Assessment Method(s):  Observation  Patient Position: up in bed  Vocal Quality: No Impairment  Consistency Presented: Regular;Thin  How Presented: Self-fed/presented;Successive Swallows;Straw  Bolus Acceptance: No impairment  Bolus Formation/Control: No impairment  Propulsion: No impairment  Oral Residue: None  Aspiration Signs/Symptoms: None  Pharyngeal Phase Characteristics: No impairment, issues, or problems  Effective Modifications: None                 Respiratory Status/Airway:  Room air          Outcome Measure:  Functional Oral Intake Scale (FOIS): 7--Total oral diet with no restrictions    After treatment:   Patient left in no apparent distress in bed, Call bell left within reach, and Nursing notified    COMMUNICATION/EDUCATION:   Patient was educated regarding possible dysphagia.  She demonstrated Good understanding as evidenced by Verbalizing understanding.     The patient's plan of care including recommendations, planned interventions, and recommended diet changes were discussed with: Registered nurse    Patient/family agree to work toward stated goals and plan of care    Thank you,  Michelle Das, SLP    SLP Individual Minutes  Time In: 1045  Time Out: 1056  Minutes: 11

## 2025-03-07 NOTE — PLAN OF CARE
Problem: Physical Therapy - Adult  Goal: By Discharge: Performs mobility at highest level of function for planned discharge setting.  See evaluation for individualized goals.  Description: FUNCTIONAL STATUS PRIOR TO ADMISSION: Patient was independent and active without use of DME.    HOME SUPPORT PRIOR TO ADMISSION: The patient live in an independent living facility.    Physical Therapy Goals  Initiated 3/7/2025  1.  Patient will move from supine to sit and sit to supine, scoot up and down, and roll side to side in bed with independence within 7 day(s).    2.  Patient will perform sit to stand with independence within 7 day(s).  3.  Patient will transfer from bed to chair and chair to bed with independence using the least restrictive device within 7 day(s).  4.  Patient will ambulate with independence for 100 feet with the least restrictive device within 7 day(s).     Outcome: Progressing   PHYSICAL THERAPY EVALUATION    Patient: Makenzie Zepeda (89 y.o. female)  Date: 3/7/2025  Primary Diagnosis: Pneumonia due to other specified bacteria (HCC) [J15.8]  Acute hypoxic respiratory failure (HCC) [J96.01]       Precautions:              ASSESSMENT :   DEFICITS/IMPAIRMENTS:   The patient is limited by decreased functional mobility, independence in ADLs, high-level IADLs, ROM, strength, activity tolerance, endurance, safety awareness, cognition, coordination, balance     Based on the impairments listed above patient presents with generalized weakness and has been falling at home. Communicated with nurse cleared for therapy. Patient supine on bed when received, mobilized patient today with rolling walker. Sit on the side of bed sit to stand multiple times and ambulate with rolling walker towards the recliner. Patient oriented to self only and thinks she lives here in the hospital. OOB to chair as tolerated performed some active range of motion exercise on both LE all planes. Educated and instructed patient to continue to do

## 2025-03-08 LAB
ANION GAP SERPL CALC-SCNC: 9 MMOL/L (ref 2–12)
BASOPHILS # BLD: 0.03 K/UL (ref 0–0.1)
BASOPHILS NFR BLD: 0.2 % (ref 0–1)
BUN SERPL-MCNC: 17 MG/DL (ref 6–20)
BUN/CREAT SERPL: 23 (ref 12–20)
CALCIUM SERPL-MCNC: 8.3 MG/DL (ref 8.5–10.1)
CHLORIDE SERPL-SCNC: 99 MMOL/L (ref 97–108)
CO2 SERPL-SCNC: 24 MMOL/L (ref 21–32)
CREAT SERPL-MCNC: 0.75 MG/DL (ref 0.55–1.02)
DIFFERENTIAL METHOD BLD: ABNORMAL
EOSINOPHIL # BLD: 0.02 K/UL (ref 0–0.4)
EOSINOPHIL NFR BLD: 0.1 % (ref 0–7)
ERYTHROCYTE [DISTWIDTH] IN BLOOD BY AUTOMATED COUNT: 11.7 % (ref 11.5–14.5)
FOLATE SERPL-MCNC: 14.9 NG/ML (ref 5–21)
GLUCOSE SERPL-MCNC: 129 MG/DL (ref 65–100)
HCT VFR BLD AUTO: 31.4 % (ref 35–47)
HGB BLD-MCNC: 11.4 G/DL (ref 11.5–16)
IMM GRANULOCYTES # BLD AUTO: 0.19 K/UL (ref 0–0.04)
IMM GRANULOCYTES NFR BLD AUTO: 1.4 % (ref 0–0.5)
LYMPHOCYTES # BLD: 1.18 K/UL (ref 0.8–3.5)
LYMPHOCYTES NFR BLD: 8.8 % (ref 12–49)
MCH RBC QN AUTO: 36.8 PG (ref 26–34)
MCHC RBC AUTO-ENTMCNC: 36.3 G/DL (ref 30–36.5)
MCV RBC AUTO: 101.3 FL (ref 80–99)
MONOCYTES # BLD: 0.79 K/UL (ref 0–1)
MONOCYTES NFR BLD: 5.9 % (ref 5–13)
NEUTS SEG # BLD: 11.16 K/UL (ref 1.8–8)
NEUTS SEG NFR BLD: 83.6 % (ref 32–75)
NRBC # BLD: 0 K/UL (ref 0–0.01)
NRBC BLD-RTO: 0 PER 100 WBC
PLATELET # BLD AUTO: 237 K/UL (ref 150–400)
PMV BLD AUTO: 9.7 FL (ref 8.9–12.9)
POTASSIUM SERPL-SCNC: 3.8 MMOL/L (ref 3.5–5.1)
RBC # BLD AUTO: 3.1 M/UL (ref 3.8–5.2)
SODIUM SERPL-SCNC: 132 MMOL/L (ref 136–145)
T4 FREE SERPL-MCNC: 1.2 NG/DL (ref 0.8–1.5)
TSH SERPL DL<=0.05 MIU/L-ACNC: 0.68 UIU/ML (ref 0.36–3.74)
VIT B12 SERPL-MCNC: 1448 PG/ML (ref 193–986)
WBC # BLD AUTO: 13.4 K/UL (ref 3.6–11)

## 2025-03-08 PROCEDURE — 6370000000 HC RX 637 (ALT 250 FOR IP): Performed by: HOSPITALIST

## 2025-03-08 PROCEDURE — 94761 N-INVAS EAR/PLS OXIMETRY MLT: CPT

## 2025-03-08 PROCEDURE — 84443 ASSAY THYROID STIM HORMONE: CPT

## 2025-03-08 PROCEDURE — 84439 ASSAY OF FREE THYROXINE: CPT

## 2025-03-08 PROCEDURE — 6360000002 HC RX W HCPCS: Performed by: HOSPITALIST

## 2025-03-08 PROCEDURE — 82746 ASSAY OF FOLIC ACID SERUM: CPT

## 2025-03-08 PROCEDURE — 1100000000 HC RM PRIVATE

## 2025-03-08 PROCEDURE — 2500000003 HC RX 250 WO HCPCS: Performed by: FAMILY MEDICINE

## 2025-03-08 PROCEDURE — 2700000000 HC OXYGEN THERAPY PER DAY

## 2025-03-08 PROCEDURE — 2500000003 HC RX 250 WO HCPCS: Performed by: HOSPITALIST

## 2025-03-08 PROCEDURE — 6370000000 HC RX 637 (ALT 250 FOR IP)

## 2025-03-08 PROCEDURE — 85025 COMPLETE CBC W/AUTO DIFF WBC: CPT

## 2025-03-08 PROCEDURE — 82607 VITAMIN B-12: CPT

## 2025-03-08 PROCEDURE — 6360000002 HC RX W HCPCS: Performed by: FAMILY MEDICINE

## 2025-03-08 PROCEDURE — 6370000000 HC RX 637 (ALT 250 FOR IP): Performed by: FAMILY MEDICINE

## 2025-03-08 PROCEDURE — 80048 BASIC METABOLIC PNL TOTAL CA: CPT

## 2025-03-08 RX ORDER — CARBAMAZEPINE 200 MG/1
600 TABLET ORAL 2 TIMES DAILY
Status: DISCONTINUED | OUTPATIENT
Start: 2025-03-08 | End: 2025-03-08

## 2025-03-08 RX ORDER — BETAHISTINE HCL 100 %
8 POWDER (GRAM) MISCELLANEOUS 2 TIMES DAILY
Status: DISCONTINUED | OUTPATIENT
Start: 2025-03-08 | End: 2025-03-09

## 2025-03-08 RX ORDER — BACLOFEN 10 MG/1
5 TABLET ORAL DAILY
Status: DISCONTINUED | OUTPATIENT
Start: 2025-03-08 | End: 2025-03-12 | Stop reason: HOSPADM

## 2025-03-08 RX ORDER — BETAHISTINE HCL 100 %
8 POWDER (GRAM) MISCELLANEOUS 2 TIMES DAILY
Status: DISCONTINUED | OUTPATIENT
Start: 2025-03-08 | End: 2025-03-08

## 2025-03-08 RX ORDER — CODEINE PHOSPHATE AND GUAIFENESIN 10; 100 MG/5ML; MG/5ML
5 SOLUTION ORAL EVERY 4 HOURS PRN
Status: DISCONTINUED | OUTPATIENT
Start: 2025-03-08 | End: 2025-03-12 | Stop reason: HOSPADM

## 2025-03-08 RX ORDER — CARBAMAZEPINE 200 MG/1
600 TABLET ORAL 2 TIMES DAILY
Status: DISCONTINUED | OUTPATIENT
Start: 2025-03-08 | End: 2025-03-12 | Stop reason: HOSPADM

## 2025-03-08 RX ADMIN — DOXYCYCLINE HYCLATE 100 MG: 100 TABLET, COATED ORAL at 21:32

## 2025-03-08 RX ADMIN — WATER 1000 MG: 1 INJECTION INTRAMUSCULAR; INTRAVENOUS; SUBCUTANEOUS at 09:29

## 2025-03-08 RX ADMIN — AMLODIPINE BESYLATE 10 MG: 5 TABLET ORAL at 09:27

## 2025-03-08 RX ADMIN — CARBAMAZEPINE 600 MG: 200 TABLET ORAL at 16:17

## 2025-03-08 RX ADMIN — LOSARTAN POTASSIUM 100 MG: 50 TABLET, FILM COATED ORAL at 09:27

## 2025-03-08 RX ADMIN — BACLOFEN 5 MG: 10 TABLET ORAL at 16:17

## 2025-03-08 RX ADMIN — DOXYCYCLINE HYCLATE 100 MG: 100 TABLET, COATED ORAL at 09:27

## 2025-03-08 RX ADMIN — HYDRALAZINE HYDROCHLORIDE 25 MG: 25 TABLET ORAL at 13:13

## 2025-03-08 RX ADMIN — SODIUM CHLORIDE, PRESERVATIVE FREE 10 ML: 5 INJECTION INTRAVENOUS at 21:33

## 2025-03-08 RX ADMIN — Medication 3 MG: at 21:32

## 2025-03-08 RX ADMIN — SERTRALINE 25 MG: 50 TABLET, FILM COATED ORAL at 10:11

## 2025-03-08 RX ADMIN — SODIUM CHLORIDE, PRESERVATIVE FREE 10 ML: 5 INJECTION INTRAVENOUS at 09:37

## 2025-03-08 RX ADMIN — ENOXAPARIN SODIUM 40 MG: 100 INJECTION SUBCUTANEOUS at 21:32

## 2025-03-08 RX ADMIN — ONDANSETRON 4 MG: 4 TABLET, ORALLY DISINTEGRATING ORAL at 13:13

## 2025-03-08 RX ADMIN — HYDRALAZINE HYDROCHLORIDE 25 MG: 25 TABLET ORAL at 21:32

## 2025-03-08 NOTE — CARE COORDINATION
Care Management Initial Assessment  3/8/2025 11:33 AM  If patient is discharged prior to next notation, then this note serves as note for discharge by case management.    Reason for Admission:   Pneumonia due to other specified bacteria (HCC) [J15.8]  Acute hypoxic respiratory failure (HCC) [J96.01]         Patient Admission Status: Inpatient  Date Admitted to IN: 3/6/25  RUR: Readmission Risk Score: 13.8    Hospitalization in the last 30 days (Readmission):  No        Advance Care Planning:  Code Status: DNR  Primary Healthcare Decision Maker:     Advance Directive: has an advanced directive - a copy HAS NOT been provided.     __________________________________________________________________________  Assessment:   Transition of care  RUR 14%    PT/OT consult  Speech consult    Case management made call to daughter Mckayla Cox, discussed discharge plan - SNF vs IPR.    Patient resides in the independent living side of [facility].  Brenda is interested in patient being referred to [facility] for skilled rehabilitation at time of discharge.    Discussed IPR- Mrs. Cox is ok for additional referrals to be sent to [facility] and [facility] for review.      Patient has caregiver support 3-4 days a week with backup support from daughter several days a week.     [facility].    Case management to follow for discharge planning/needs. [facility]          Comments:     Discharge Concerns: []Yes [x]No []Unknown   Describe:    Financial concerns/barriers: []Yes, explain: [x]No []Unknown/Not discussed  __________________________________________________________________________    Insurer:   Active Insurance as of 3/6/2025       Primary Coverage       Payor Plan Insurance Group Employer/Plan Group    MEDICARE MEDICARE PART A AND B        Payor Address Payor Phone Number Payor Fax Number Effective Dates    [address] [address] [phone]  12/1/1999 - None Entered    [facility] [id number]         Subscriber Name

## 2025-03-08 NOTE — PLAN OF CARE
Problem: Physical Therapy - Adult  Goal: By Discharge: Performs mobility at highest level of function for planned discharge setting.  See evaluation for individualized goals.  Description: FUNCTIONAL STATUS PRIOR TO ADMISSION: Patient was independent and active without use of DME.    HOME SUPPORT PRIOR TO ADMISSION: The patient live in an independent living facility.    Physical Therapy Goals  Initiated 3/7/2025  1.  Patient will move from supine to sit and sit to supine, scoot up and down, and roll side to side in bed with independence within 7 day(s).    2.  Patient will perform sit to stand with independence within 7 day(s).  3.  Patient will transfer from bed to chair and chair to bed with independence using the least restrictive device within 7 day(s).  4.  Patient will ambulate with independence for 100 feet with the least restrictive device within 7 day(s).     3/7/2025 1538 by Enmanuel Brown, PT  Outcome: Progressing     Problem: Occupational Therapy - Adult  Goal: By Discharge: Performs self-care activities at highest level of function for planned discharge setting.  See evaluation for individualized goals.  Description: FUNCTIONAL STATUS PRIOR TO ADMISSION:  Patient reports independent with ADLs, able to ambulate with no AD, but uses a rolling walker when she can find.  Patient poor historian during assessment.   ,  ,  ,  ,  ,  ,  ,  ,  ,  ,       HOME SUPPORT: Patient lived in Independent living facility.    Occupational Therapy Goals:  Initiated 3/7/2025  1.  Patient will perform lower body dressing with Supervision within 7 day(s).  2.  Patient will perform grooming with Supervision within 7 day(s).  3.  Patient will perform toilet transfers with Supervision  within 7 day(s).  4.  Patient will perform all aspects of toileting with Supervision within 7 day(s).  5.  Patient will participate in upper extremity therapeutic exercise/activities with Supervision for 10 minutes within 7 day(s).    6.   Patient will utilize energy conservation techniques during functional activities with verbal cues within 7 day(s).    3/7/2025 1457 by Karuna Witt, OT  Outcome: Progressing     Problem: ABCDS Injury Assessment  Goal: Absence of physical injury  Outcome: Progressing     Problem: Skin/Tissue Integrity  Goal: Skin integrity remains intact  Description: 1.  Monitor for areas of redness and/or skin breakdown  2.  Assess vascular access sites hourly  3.  Every 4-6 hours minimum:  Change oxygen saturation probe site  4.  Every 4-6 hours:  If on nasal continuous positive airway pressure, respiratory therapy assess nares and determine need for appliance change or resting period  Outcome: Progressing     Problem: Safety - Adult  Goal: Free from fall injury  Outcome: Progressing

## 2025-03-08 NOTE — PROGRESS NOTES
CHRIS ANAYA [facility]  [address] [place] [address] [phone]      Medical Progress Note      NAME: Makenzie Zepeda   :  1934  MRM:  [id number]    Date/Time: 3/8/2025  12:42 PM           Assessment / Plan:   89 years old female who presented with symptoms of fatigue and cough, admitted with bilateral community-acquired pneumonia    Community-acquired pneumonia  Fatigue  CT chest obtained on admission with bilateral upper lobe airspace disease.   Currently not requiring supplemental oxygen, however patient reporting she does not feel improved compared to yesterday as she is still fatigued and nauseous.  Noted bump in leukocytosis; likely reactive from steroids given on admission.  Continue with doxycycline and Rocephin.  Legionella antigen pending.  Continue to monitor fever curve.  Check WBC tomorrow.  Continue symptomatic management.  If does not continue to clinically improve by tomorrow, will consider repeat chest x-ray.    Hyponatremia  Suspect likely related to prerenal versus SIADH.  Improved with IV fluid hydration.  Home hydrochlorothiazide and spironolactone currently on hold.  No further IV fluids at this time.  Continue to closely monitor.    OPAL, POA  Resolved with IV fluid hydration.  No further IV fluids at this time.    Essential hypertension  Blood pressure currently not at goal.  Continue with home amlodipine and losartan for now; also resuming oral hydralazine.  Hydrochlorothiazide and spironolactone on hold as above.  Continue to monitor and titrate accordingly.    Slurred speech  Initially reported concerns for slurred speech which resulted in CT scan of head being obtained.  No acute pathology noted.  Patient speaking fluently currently.    Memory loss  Alert and oriented to person and place but not time.  Supportive care.    Chronic macrocytic anemia  Check B12, folic acid and TSH.                 Care Plan discussed with: Patient    Prophylaxis:   Lovenox    Disposition: Pending.           ___________________________________________________    Attending Physician: Temo Hernandez MD        Subjective:     Chief Complaint: Awake and alert.  Reports she is nauseous, coughing, and does not significantly feel improved compared to yesterday; reports she feels sleepy and lethargic.  No current fever or chills.     Objective:       Vitals:     Last 24hrs VS reviewed since prior progress note. Most recent are:    Vitals:    03/08/25 1114   BP: (!) 162/52   Pulse: 83   Resp: 16   Temp: 97.7 °F (36.5 °C)   SpO2: 92%     SpO2 Readings from Last 6 Encounters:   03/08/25 92%   06/27/24 97%   03/18/24 96%          Intake/Output Summary (Last 24 hours) at 3/8/2025 1242  Last data filed at 3/7/2025 2050  Gross per 24 hour   Intake 10 ml   Output 500 ml   Net -490 ml          Exam:     Physical Exam:    Gen:  No acute distress. Non toxic appearing.  HEENT:  NC/AT. PERRL.  Neck:  Supple.  Resp:  Unlabored breathing. Clear breath sounds with good air entry. No wheezes rales or rhonchi.  Card:  Regular rate and rhythm. Normal S1 and S2.  Abd:  Soft, non-tender, non-distended, normoactive bowel sounds.  Ext: No clubbing, cyanosis or edema.  Neuro:  Moving all extremities with no gross focal deficits appreciated. Oriented to person and place but not time.    Medications Reviewed: (see below)    Lab Data Reviewed: (see below)  ______________________________________________________________________    Medications:     Current Facility-Administered Medications   Medication Dose Route Frequency    melatonin tablet 3 mg  3 mg Oral Nightly PRN    enoxaparin (LOVENOX) injection 40 mg  40 mg SubCUTAneous Q24H    cefTRIAXone (ROCEPHIN) 1,000 mg in sterile water 10 mL IV syringe  1,000 mg IntraVENous Daily    doxycycline hyclate (VIBRA-TABS) tablet 100 mg  100 mg Oral 2 times per day    ipratropium 0.5 mg-albuterol 2.5 mg (DUONEB) nebulizer solution 1 Dose  1 Dose Inhalation Q4H PRN

## 2025-03-09 ENCOUNTER — APPOINTMENT (OUTPATIENT)
Facility: HOSPITAL | Age: 89
DRG: 193 | End: 2025-03-09
Payer: MEDICARE

## 2025-03-09 LAB
ANION GAP SERPL CALC-SCNC: 7 MMOL/L (ref 2–12)
BASOPHILS # BLD: 0.05 K/UL (ref 0–0.1)
BASOPHILS NFR BLD: 0.5 % (ref 0–1)
BUN SERPL-MCNC: 14 MG/DL (ref 6–20)
BUN/CREAT SERPL: 21 (ref 12–20)
CALCIUM SERPL-MCNC: 8.1 MG/DL (ref 8.5–10.1)
CHLORIDE SERPL-SCNC: 100 MMOL/L (ref 97–108)
CO2 SERPL-SCNC: 25 MMOL/L (ref 21–32)
CREAT SERPL-MCNC: 0.68 MG/DL (ref 0.55–1.02)
DIFFERENTIAL METHOD BLD: ABNORMAL
EOSINOPHIL # BLD: 0.04 K/UL (ref 0–0.4)
EOSINOPHIL NFR BLD: 0.4 % (ref 0–7)
ERYTHROCYTE [DISTWIDTH] IN BLOOD BY AUTOMATED COUNT: 11.7 % (ref 11.5–14.5)
GLUCOSE SERPL-MCNC: 108 MG/DL (ref 65–100)
HCT VFR BLD AUTO: 29.2 % (ref 35–47)
HGB BLD-MCNC: 10.3 G/DL (ref 11.5–16)
IMM GRANULOCYTES # BLD AUTO: 0.11 K/UL (ref 0–0.04)
IMM GRANULOCYTES NFR BLD AUTO: 1.2 % (ref 0–0.5)
LYMPHOCYTES # BLD: 1.17 K/UL (ref 0.8–3.5)
LYMPHOCYTES NFR BLD: 12.8 % (ref 12–49)
MCH RBC QN AUTO: 36 PG (ref 26–34)
MCHC RBC AUTO-ENTMCNC: 35.3 G/DL (ref 30–36.5)
MCV RBC AUTO: 102.1 FL (ref 80–99)
MONOCYTES # BLD: 1.01 K/UL (ref 0–1)
MONOCYTES NFR BLD: 11 % (ref 5–13)
NEUTS SEG # BLD: 6.79 K/UL (ref 1.8–8)
NEUTS SEG NFR BLD: 74.1 % (ref 32–75)
NRBC # BLD: 0 K/UL (ref 0–0.01)
NRBC BLD-RTO: 0 PER 100 WBC
NT PRO BNP: 2979 PG/ML
PLATELET # BLD AUTO: 241 K/UL (ref 150–400)
PMV BLD AUTO: 10 FL (ref 8.9–12.9)
POTASSIUM SERPL-SCNC: 3.1 MMOL/L (ref 3.5–5.1)
RBC # BLD AUTO: 2.86 M/UL (ref 3.8–5.2)
SODIUM SERPL-SCNC: 132 MMOL/L (ref 136–145)
WBC # BLD AUTO: 9.2 K/UL (ref 3.6–11)

## 2025-03-09 PROCEDURE — 36415 COLL VENOUS BLD VENIPUNCTURE: CPT

## 2025-03-09 PROCEDURE — 2500000003 HC RX 250 WO HCPCS: Performed by: FAMILY MEDICINE

## 2025-03-09 PROCEDURE — 1100000000 HC RM PRIVATE

## 2025-03-09 PROCEDURE — 85025 COMPLETE CBC W/AUTO DIFF WBC: CPT

## 2025-03-09 PROCEDURE — 2700000000 HC OXYGEN THERAPY PER DAY

## 2025-03-09 PROCEDURE — 6370000000 HC RX 637 (ALT 250 FOR IP): Performed by: HOSPITALIST

## 2025-03-09 PROCEDURE — 6360000002 HC RX W HCPCS: Performed by: HOSPITALIST

## 2025-03-09 PROCEDURE — 71045 X-RAY EXAM CHEST 1 VIEW: CPT

## 2025-03-09 PROCEDURE — 2500000003 HC RX 250 WO HCPCS: Performed by: HOSPITALIST

## 2025-03-09 PROCEDURE — 83880 ASSAY OF NATRIURETIC PEPTIDE: CPT

## 2025-03-09 PROCEDURE — 80048 BASIC METABOLIC PNL TOTAL CA: CPT

## 2025-03-09 PROCEDURE — 6370000000 HC RX 637 (ALT 250 FOR IP)

## 2025-03-09 PROCEDURE — 94761 N-INVAS EAR/PLS OXIMETRY MLT: CPT

## 2025-03-09 PROCEDURE — 6370000000 HC RX 637 (ALT 250 FOR IP): Performed by: FAMILY MEDICINE

## 2025-03-09 RX ORDER — FUROSEMIDE 10 MG/ML
20 INJECTION INTRAMUSCULAR; INTRAVENOUS ONCE
Status: COMPLETED | OUTPATIENT
Start: 2025-03-09 | End: 2025-03-09

## 2025-03-09 RX ORDER — BETAHISTINE HCL 100 %
8 POWDER (GRAM) MISCELLANEOUS ONCE
Status: DISCONTINUED | OUTPATIENT
Start: 2025-03-09 | End: 2025-03-09

## 2025-03-09 RX ORDER — BETAHISTINE HCL 100 %
8 POWDER (GRAM) MISCELLANEOUS 2 TIMES DAILY
Status: DISCONTINUED | OUTPATIENT
Start: 2025-03-09 | End: 2025-03-12 | Stop reason: HOSPADM

## 2025-03-09 RX ADMIN — SODIUM CHLORIDE, PRESERVATIVE FREE 10 ML: 5 INJECTION INTRAVENOUS at 20:15

## 2025-03-09 RX ADMIN — HYDRALAZINE HYDROCHLORIDE 25 MG: 25 TABLET ORAL at 07:00

## 2025-03-09 RX ADMIN — CARBAMAZEPINE 600 MG: 200 TABLET ORAL at 20:13

## 2025-03-09 RX ADMIN — ENOXAPARIN SODIUM 40 MG: 100 INJECTION SUBCUTANEOUS at 20:14

## 2025-03-09 RX ADMIN — FUROSEMIDE 20 MG: 10 INJECTION, SOLUTION INTRAMUSCULAR; INTRAVENOUS at 17:24

## 2025-03-09 RX ADMIN — AMLODIPINE BESYLATE 10 MG: 5 TABLET ORAL at 07:58

## 2025-03-09 RX ADMIN — WATER 1000 MG: 1 INJECTION INTRAMUSCULAR; INTRAVENOUS; SUBCUTANEOUS at 07:58

## 2025-03-09 RX ADMIN — DOXYCYCLINE HYCLATE 100 MG: 100 TABLET, COATED ORAL at 20:13

## 2025-03-09 RX ADMIN — DOXYCYCLINE HYCLATE 100 MG: 100 TABLET, COATED ORAL at 07:58

## 2025-03-09 RX ADMIN — HYDRALAZINE HYDROCHLORIDE 25 MG: 25 TABLET ORAL at 15:29

## 2025-03-09 RX ADMIN — SERTRALINE 25 MG: 50 TABLET, FILM COATED ORAL at 07:58

## 2025-03-09 RX ADMIN — SODIUM CHLORIDE, PRESERVATIVE FREE 10 ML: 5 INJECTION INTRAVENOUS at 07:59

## 2025-03-09 RX ADMIN — POTASSIUM BICARBONATE 40 MEQ: 782 TABLET, EFFERVESCENT ORAL at 15:29

## 2025-03-09 RX ADMIN — Medication 3 MG: at 20:14

## 2025-03-09 RX ADMIN — HYDRALAZINE HYDROCHLORIDE 25 MG: 25 TABLET ORAL at 20:13

## 2025-03-09 RX ADMIN — CARBAMAZEPINE 600 MG: 200 TABLET ORAL at 07:58

## 2025-03-09 RX ADMIN — Medication 8 MG: at 13:20

## 2025-03-09 RX ADMIN — BACLOFEN 5 MG: 10 TABLET ORAL at 07:58

## 2025-03-09 RX ADMIN — ACETAMINOPHEN 650 MG: 325 TABLET ORAL at 20:14

## 2025-03-09 RX ADMIN — LOSARTAN POTASSIUM 100 MG: 50 TABLET, FILM COATED ORAL at 07:58

## 2025-03-09 RX ADMIN — Medication 8 MG: at 20:13

## 2025-03-09 NOTE — PLAN OF CARE
Problem: Safety - Adult  Goal: Free from fall injury  Outcome: Progressing     Problem: Skin/Tissue Integrity  Goal: Skin integrity remains intact  Description: 1.  Monitor for areas of redness and/or skin breakdown  2.  Assess vascular access sites hourly  3.  Every 4-6 hours minimum:  Change oxygen saturation probe site  4.  Every 4-6 hours:  If on nasal continuous positive airway pressure, respiratory therapy assess nares and determine need for appliance change or resting period  Outcome: Progressing     Problem: ABCDS Injury Assessment  Goal: Absence of physical injury  Outcome: Progressing     Problem: Discharge Planning  Goal: Discharge to home or other facility with appropriate resources  Outcome: Progressing     Problem: Respiratory - Adult  Goal: Achieves optimal ventilation and oxygenation  Outcome: Progressing     Problem: Infection - Adult  Goal: Absence of infection at discharge  Outcome: Progressing

## 2025-03-09 NOTE — SIGNIFICANT EVENT
With regards to patient's betahistine dihydrochloride, I was informed by pharmacy that this medication is not either available in the [place] or FDA approved.  Discussed with patient's daughter who reports that patient needs this medication otherwise she will start feeling symptomatic.  Further discussed the use of this medication with pharmacy, risk-management, and coordination with the CMO Dr. Villasenor.  We have decided this medication is required to continue in the hospital as there are no current FDA approved alternatives.  Patient has her home medications.

## 2025-03-10 ENCOUNTER — APPOINTMENT (OUTPATIENT)
Facility: HOSPITAL | Age: 89
DRG: 193 | End: 2025-03-10
Payer: MEDICARE

## 2025-03-10 LAB
ALBUMIN SERPL-MCNC: 2.5 G/DL (ref 3.5–5)
ALBUMIN/GLOB SERPL: 0.7 (ref 1.1–2.2)
ALP SERPL-CCNC: 42 U/L (ref 45–117)
ALT SERPL-CCNC: 27 U/L (ref 12–78)
AMMONIA PLAS-SCNC: <10 UMOL/L
ANION GAP SERPL CALC-SCNC: 12 MMOL/L (ref 2–12)
ANION GAP SERPL CALC-SCNC: 7 MMOL/L (ref 2–12)
AST SERPL-CCNC: 24 U/L (ref 15–37)
BASE DEFICIT BLD-SCNC: 1.3 MMOL/L
BASOPHILS # BLD: 0 K/UL (ref 0–0.1)
BASOPHILS # BLD: 0.06 K/UL (ref 0–0.1)
BASOPHILS NFR BLD: 0 % (ref 0–1)
BASOPHILS NFR BLD: 0.8 % (ref 0–1)
BDY SITE: NORMAL
BILIRUB SERPL-MCNC: 0.9 MG/DL (ref 0.2–1)
BUN SERPL-MCNC: 21 MG/DL (ref 6–20)
BUN SERPL-MCNC: 22 MG/DL (ref 6–20)
BUN/CREAT SERPL: 22 (ref 12–20)
BUN/CREAT SERPL: 23 (ref 12–20)
CALCIUM SERPL-MCNC: 8.5 MG/DL (ref 8.5–10.1)
CALCIUM SERPL-MCNC: 8.7 MG/DL (ref 8.5–10.1)
CHLORIDE SERPL-SCNC: 96 MMOL/L (ref 97–108)
CHLORIDE SERPL-SCNC: 99 MMOL/L (ref 97–108)
CO2 SERPL-SCNC: 23 MMOL/L (ref 21–32)
CO2 SERPL-SCNC: 28 MMOL/L (ref 21–32)
COMMENT:: NORMAL
COMMENT:: NORMAL
CREAT SERPL-MCNC: 0.91 MG/DL (ref 0.55–1.02)
CREAT SERPL-MCNC: 1 MG/DL (ref 0.55–1.02)
DIFFERENTIAL METHOD BLD: ABNORMAL
DIFFERENTIAL METHOD BLD: ABNORMAL
EOSINOPHIL # BLD: 0 K/UL (ref 0–0.4)
EOSINOPHIL # BLD: 0.08 K/UL (ref 0–0.4)
EOSINOPHIL NFR BLD: 0 % (ref 0–7)
EOSINOPHIL NFR BLD: 1.1 % (ref 0–7)
ERYTHROCYTE [DISTWIDTH] IN BLOOD BY AUTOMATED COUNT: 11.7 % (ref 11.5–14.5)
ERYTHROCYTE [DISTWIDTH] IN BLOOD BY AUTOMATED COUNT: 11.8 % (ref 11.5–14.5)
GAS FLOW.O2 O2 DELIVERY SYS: NORMAL
GLOBULIN SER CALC-MCNC: 3.7 G/DL (ref 2–4)
GLUCOSE BLD STRIP.AUTO-MCNC: 106 MG/DL (ref 65–117)
GLUCOSE BLD STRIP.AUTO-MCNC: 134 MG/DL (ref 65–117)
GLUCOSE SERPL-MCNC: 102 MG/DL (ref 65–100)
GLUCOSE SERPL-MCNC: 148 MG/DL (ref 65–100)
HCO3 BLD-SCNC: 23.1 MMOL/L (ref 21–28)
HCT VFR BLD AUTO: 32.1 % (ref 35–47)
HCT VFR BLD AUTO: 33 % (ref 35–47)
HGB BLD-MCNC: 11.3 G/DL (ref 11.5–16)
HGB BLD-MCNC: 11.6 G/DL (ref 11.5–16)
IMM GRANULOCYTES # BLD AUTO: 0 K/UL
IMM GRANULOCYTES # BLD AUTO: 0.13 K/UL (ref 0–0.04)
IMM GRANULOCYTES NFR BLD AUTO: 0 %
IMM GRANULOCYTES NFR BLD AUTO: 1.8 % (ref 0–0.5)
LACTATE SERPL-SCNC: 0.8 MMOL/L (ref 0.4–2)
LYMPHOCYTES # BLD: 0.41 K/UL (ref 0.8–3.5)
LYMPHOCYTES # BLD: 1.03 K/UL (ref 0.8–3.5)
LYMPHOCYTES NFR BLD: 14 % (ref 12–49)
LYMPHOCYTES NFR BLD: 6 % (ref 12–49)
MAGNESIUM SERPL-MCNC: 1.8 MG/DL (ref 1.6–2.4)
MCH RBC QN AUTO: 35.8 PG (ref 26–34)
MCH RBC QN AUTO: 35.8 PG (ref 26–34)
MCHC RBC AUTO-ENTMCNC: 35.2 G/DL (ref 30–36.5)
MCHC RBC AUTO-ENTMCNC: 35.2 G/DL (ref 30–36.5)
MCV RBC AUTO: 101.6 FL (ref 80–99)
MCV RBC AUTO: 101.9 FL (ref 80–99)
MONOCYTES # BLD: 0.54 K/UL (ref 0–1)
MONOCYTES # BLD: 0.92 K/UL (ref 0–1)
MONOCYTES NFR BLD: 12.5 % (ref 5–13)
MONOCYTES NFR BLD: 8 % (ref 5–13)
NEUTS BAND NFR BLD MANUAL: 4 % (ref 0–6)
NEUTS SEG # BLD: 5.12 K/UL (ref 1.8–8)
NEUTS SEG # BLD: 5.85 K/UL (ref 1.8–8)
NEUTS SEG NFR BLD: 69.8 % (ref 32–75)
NEUTS SEG NFR BLD: 82 % (ref 32–75)
NRBC # BLD: 0 K/UL (ref 0–0.01)
NRBC # BLD: 0 K/UL (ref 0–0.01)
NRBC BLD-RTO: 0 PER 100 WBC
NRBC BLD-RTO: 0 PER 100 WBC
O2/TOTAL GAS SETTING VFR VENT: 2 %
PCO2 BLD: 36.6 MMHG (ref 35–48)
PH BLD: 7.41 (ref 7.35–7.45)
PLATELET # BLD AUTO: 255 K/UL (ref 150–400)
PLATELET # BLD AUTO: 264 K/UL (ref 150–400)
PLATELET COMMENT: ABNORMAL
PMV BLD AUTO: 9.4 FL (ref 8.9–12.9)
PMV BLD AUTO: 9.6 FL (ref 8.9–12.9)
PO2 BLD: 84 MMHG (ref 83–108)
POTASSIUM SERPL-SCNC: 3.3 MMOL/L (ref 3.5–5.1)
POTASSIUM SERPL-SCNC: 3.6 MMOL/L (ref 3.5–5.1)
PROT SERPL-MCNC: 6.2 G/DL (ref 6.4–8.2)
RBC # BLD AUTO: 3.16 M/UL (ref 3.8–5.2)
RBC # BLD AUTO: 3.24 M/UL (ref 3.8–5.2)
RBC MORPH BLD: ABNORMAL
SAO2 % BLD: 96.4 % (ref 92–97)
SERVICE CMNT-IMP: ABNORMAL
SERVICE CMNT-IMP: NORMAL
SODIUM SERPL-SCNC: 131 MMOL/L (ref 136–145)
SODIUM SERPL-SCNC: 134 MMOL/L (ref 136–145)
SPECIMEN HOLD: NORMAL
SPECIMEN HOLD: NORMAL
SPECIMEN TYPE: NORMAL
T4 FREE SERPL-MCNC: 1.3 NG/DL (ref 0.8–1.5)
TSH SERPL DL<=0.05 MIU/L-ACNC: 0.75 UIU/ML (ref 0.36–3.74)
WBC # BLD AUTO: 6.8 K/UL (ref 3.6–11)
WBC # BLD AUTO: 7.3 K/UL (ref 3.6–11)
WBC MORPH BLD: ABNORMAL

## 2025-03-10 PROCEDURE — 2700000000 HC OXYGEN THERAPY PER DAY

## 2025-03-10 PROCEDURE — 0042T CT BRAIN PERFUSION: CPT

## 2025-03-10 PROCEDURE — 6370000000 HC RX 637 (ALT 250 FOR IP): Performed by: HOSPITALIST

## 2025-03-10 PROCEDURE — 2500000003 HC RX 250 WO HCPCS: Performed by: FAMILY MEDICINE

## 2025-03-10 PROCEDURE — 36415 COLL VENOUS BLD VENIPUNCTURE: CPT

## 2025-03-10 PROCEDURE — 2500000003 HC RX 250 WO HCPCS: Performed by: HOSPITALIST

## 2025-03-10 PROCEDURE — 82962 GLUCOSE BLOOD TEST: CPT

## 2025-03-10 PROCEDURE — 82803 BLOOD GASES ANY COMBINATION: CPT

## 2025-03-10 PROCEDURE — 6360000004 HC RX CONTRAST MEDICATION: Performed by: HOSPITALIST

## 2025-03-10 PROCEDURE — 70551 MRI BRAIN STEM W/O DYE: CPT

## 2025-03-10 PROCEDURE — 84439 ASSAY OF FREE THYROXINE: CPT

## 2025-03-10 PROCEDURE — 1100000000 HC RM PRIVATE

## 2025-03-10 PROCEDURE — 6360000002 HC RX W HCPCS: Performed by: HOSPITALIST

## 2025-03-10 PROCEDURE — 85025 COMPLETE CBC W/AUTO DIFF WBC: CPT

## 2025-03-10 PROCEDURE — 84443 ASSAY THYROID STIM HORMONE: CPT

## 2025-03-10 PROCEDURE — 80053 COMPREHEN METABOLIC PANEL: CPT

## 2025-03-10 PROCEDURE — 36600 WITHDRAWAL OF ARTERIAL BLOOD: CPT

## 2025-03-10 PROCEDURE — 70450 CT HEAD/BRAIN W/O DYE: CPT

## 2025-03-10 PROCEDURE — 70498 CT ANGIOGRAPHY NECK: CPT

## 2025-03-10 PROCEDURE — 6370000000 HC RX 637 (ALT 250 FOR IP): Performed by: FAMILY MEDICINE

## 2025-03-10 PROCEDURE — 6360000002 HC RX W HCPCS: Performed by: FAMILY MEDICINE

## 2025-03-10 PROCEDURE — 94761 N-INVAS EAR/PLS OXIMETRY MLT: CPT

## 2025-03-10 PROCEDURE — 83735 ASSAY OF MAGNESIUM: CPT

## 2025-03-10 PROCEDURE — 71045 X-RAY EXAM CHEST 1 VIEW: CPT

## 2025-03-10 PROCEDURE — 2580000003 HC RX 258: Performed by: HOSPITALIST

## 2025-03-10 PROCEDURE — 83605 ASSAY OF LACTIC ACID: CPT

## 2025-03-10 PROCEDURE — 82140 ASSAY OF AMMONIA: CPT

## 2025-03-10 PROCEDURE — 93005 ELECTROCARDIOGRAM TRACING: CPT | Performed by: EMERGENCY MEDICINE

## 2025-03-10 PROCEDURE — 4A03X5D MEASUREMENT OF ARTERIAL FLOW, INTRACRANIAL, EXTERNAL APPROACH: ICD-10-PCS | Performed by: STUDENT IN AN ORGANIZED HEALTH CARE EDUCATION/TRAINING PROGRAM

## 2025-03-10 RX ORDER — SODIUM CHLORIDE 9 MG/ML
INJECTION, SOLUTION INTRAVENOUS CONTINUOUS
Status: DISCONTINUED | OUTPATIENT
Start: 2025-03-10 | End: 2025-03-11

## 2025-03-10 RX ORDER — ASPIRIN 300 MG/1
300 SUPPOSITORY RECTAL DAILY
Status: DISCONTINUED | OUTPATIENT
Start: 2025-03-10 | End: 2025-03-11

## 2025-03-10 RX ORDER — IOPAMIDOL 755 MG/ML
140 INJECTION, SOLUTION INTRAVASCULAR
Status: COMPLETED | OUTPATIENT
Start: 2025-03-10 | End: 2025-03-10

## 2025-03-10 RX ORDER — POTASSIUM CHLORIDE 7.45 MG/ML
10 INJECTION INTRAVENOUS
Status: COMPLETED | OUTPATIENT
Start: 2025-03-10 | End: 2025-03-10

## 2025-03-10 RX ADMIN — DOXYCYCLINE HYCLATE 100 MG: 100 TABLET, COATED ORAL at 08:11

## 2025-03-10 RX ADMIN — HYDRALAZINE HYDROCHLORIDE 25 MG: 25 TABLET ORAL at 21:09

## 2025-03-10 RX ADMIN — AMLODIPINE BESYLATE 10 MG: 5 TABLET ORAL at 08:12

## 2025-03-10 RX ADMIN — Medication 8 MG: at 21:08

## 2025-03-10 RX ADMIN — SODIUM CHLORIDE: 0.9 INJECTION, SOLUTION INTRAVENOUS at 16:57

## 2025-03-10 RX ADMIN — DOXYCYCLINE HYCLATE 100 MG: 100 TABLET, COATED ORAL at 21:07

## 2025-03-10 RX ADMIN — ASPIRIN 300 MG: 300 SUPPOSITORY RECTAL at 21:08

## 2025-03-10 RX ADMIN — ONDANSETRON 4 MG: 2 INJECTION, SOLUTION INTRAMUSCULAR; INTRAVENOUS at 23:02

## 2025-03-10 RX ADMIN — CARBAMAZEPINE 600 MG: 200 TABLET ORAL at 11:32

## 2025-03-10 RX ADMIN — Medication 8 MG: at 08:11

## 2025-03-10 RX ADMIN — POTASSIUM CHLORIDE 10 MEQ: 7.45 INJECTION INTRAVENOUS at 18:52

## 2025-03-10 RX ADMIN — WATER 1000 MG: 1 INJECTION INTRAMUSCULAR; INTRAVENOUS; SUBCUTANEOUS at 08:20

## 2025-03-10 RX ADMIN — SERTRALINE 25 MG: 50 TABLET, FILM COATED ORAL at 08:13

## 2025-03-10 RX ADMIN — SODIUM CHLORIDE, PRESERVATIVE FREE 10 ML: 5 INJECTION INTRAVENOUS at 21:07

## 2025-03-10 RX ADMIN — HYDRALAZINE HYDROCHLORIDE 25 MG: 25 TABLET ORAL at 04:40

## 2025-03-10 RX ADMIN — POTASSIUM CHLORIDE 10 MEQ: 7.45 INJECTION INTRAVENOUS at 20:19

## 2025-03-10 RX ADMIN — LOSARTAN POTASSIUM 100 MG: 50 TABLET, FILM COATED ORAL at 08:13

## 2025-03-10 RX ADMIN — IOPAMIDOL 140 ML: 755 INJECTION, SOLUTION INTRAVENOUS at 14:01

## 2025-03-10 RX ADMIN — ONDANSETRON 4 MG: 2 INJECTION, SOLUTION INTRAMUSCULAR; INTRAVENOUS at 14:39

## 2025-03-10 RX ADMIN — CARBAMAZEPINE 600 MG: 200 TABLET ORAL at 21:09

## 2025-03-10 RX ADMIN — ENOXAPARIN SODIUM 40 MG: 100 INJECTION SUBCUTANEOUS at 21:07

## 2025-03-10 RX ADMIN — SODIUM CHLORIDE, PRESERVATIVE FREE 10 ML: 5 INJECTION INTRAVENOUS at 08:22

## 2025-03-10 RX ADMIN — BACLOFEN 5 MG: 10 TABLET ORAL at 08:12

## 2025-03-10 RX ADMIN — HYDRALAZINE HYDROCHLORIDE 25 MG: 25 TABLET ORAL at 15:45

## 2025-03-10 NOTE — PROGRESS NOTES
[facility]NA[facility]  [address] [place] [address] [phone]      Medical Progress Note      NAME: Makenzie Zepeda   :  1934  MRM:  [id number]    Date/Time: 3/10/2025  1:39 PM           Assessment / Plan:   89 years old female who presented with symptoms of fatigue and cough, admitted with bilateral community-acquired pneumonia.    Rapid response 3/10 for acute encephalopathy  I immediately assessed patient at bedside for acute encephalopathy.  I initially rounded on her this morning and she was awake alert and oriented.  This is certainly an acute change.  Patient is awake, but not alert and oriented.  Nonfocal physical examination.  CT head obtained: I just discussed with teleneurology and CT head is negative.  At this point, teleneurology has high suspicion for likely metabolic causes of encephalopathy in this 89 years old female, with no further aggressive stroke workup recommended at this time.  I will continue to monitor patient throughout the day.  Concerns also exist for potential aspiration.  Keep n.p.o. for now.  ASA Suppository.  Follow-up chest x-ray.  Follow-up CTA head and neck, CBC, CMP, ABG, ammonia levels, urinalysis with reflex culture.  Will adjust management accordingly.  Updated family member at bedside; daughter.  Addendum at 5:17 PM: Reviewed studies ordered including CTA head and metabolic panel.  No obvious acute concerns.  Evaluated patient at bedside with daughter at bedside.  Patient slightly more awake, and continues to be nonfocal but reporting dizziness.  Will continue current medical management.  Obtain MRI brain.    Community-acquired pneumonia  Acute hypoxic respiratory failure  Fatigue  CT chest obtained on admission with bilateral upper lobe airspace disease.   Continue doxycycline and Rocephin; low-grade fever noted from this morning.  Legionella noted to be negative with resolved leukocytosis.  Follow-up chest x-ray as above.  Wean  PRN    acetaminophen (TYLENOL) tablet 650 mg  650 mg Oral Q6H PRN    Or    acetaminophen (TYLENOL) suppository 650 mg  650 mg Rectal Q6H PRN    amLODIPine (NORVASC) tablet 10 mg  10 mg Oral Daily    losartan (COZAAR) tablet 100 mg  100 mg Oral Daily    sertraline (ZOLOFT) tablet 25 mg  25 mg Oral Daily    hydrALAZINE (APRESOLINE) tablet 25 mg  25 mg Oral 3 times per day    hydrALAZINE (APRESOLINE) injection 10 mg  10 mg IntraVENous Q6H PRN        Lab Review:     Recent Labs     03/08/25  0256 03/09/25  0635 03/10/25  0747   WBC 13.4* 9.2 7.3   HGB 11.4* 10.3* 11.6   HCT 31.4* 29.2* 33.0*    241 255     Recent Labs     03/08/25  0256 03/09/25  0635 03/10/25  0747   * 132* 134*   K 3.8 3.1* 3.6   CL 99 100 99   CO2 24 25 28   BUN 17 14 22*   MG  --   --  1.8     No components found for: \"GLPOC\"

## 2025-03-10 NOTE — CARE COORDINATION
Care Management Progress Note    Reason for Admission:   Pneumonia due to other specified bacteria (HCC) [J15.8]  Acute hypoxic respiratory failure (HCC) [J96.01]         Patient Admission Status: Inpatient  RUR:  14%; LOS 3 days  Hospitalization in the last 30 days (Readmission):  No        Reportedly, pt resides alone.  Pt has private duty 3-4 days per week    Transition Plan of Care:  PT/OT/ST evals complete; pt ambulated 5 feet on 3/7 and SNF was recommended  [facility]   SNF - referral was sent to [facility] for review  Outpatient follow up  Mode of transport to be determined     CM will continue to follow pt for definitive discharge plans.  Alyssa

## 2025-03-10 NOTE — PROGRESS NOTES
Physical/Occupational Therapy Note:  RR called 12:53.  RN consulted stating pt lethargic./unresponsive.  CT negative for CVA.  Potential aspiration and episodes of vomiting.  Will defer PT?OT tx until next tx day.  Lara Howard, PT

## 2025-03-10 NOTE — PROGRESS NOTES
1254: RRT RN responded to RRT called for AMS. Patients visitor at bedside expressing concern that patient is more lethargic today and vomiting. Patient is drowsy, oriented x2, states she has blurry vision in left eye. Patient following simple commands with great encouragement. BUE  weak but equal. BS stable.    1301: Code Stroke paged. Dr. Hernandez at bedside.   Signs and symptoms: AMS, left eye vision changes   Time of Code Stroke Activation:1301  Provider at bedside time. Dr. Hernandez  VAN score: positive  Last Known Well (Time): 1200  Blood Glucose Result/Time: 134 at 1255   Blood Pressure: 170/54  Anticoagulants (List medications): Lovenox last given 3/9 at 2014    Patient transported to CT. CT completed. Tele Neuro spoke with Dr. Hernandez. CTA completed. Patient transported back to room 522. Code Stroke cancelled at 1422.  Lab work completed and sent for processing. ABG completed. Chest xray order to r/u aspiration after vomiting. Patient is more alert at this time. Visitors at bedside updated. No other RRT interventions needed at this time.

## 2025-03-11 ENCOUNTER — APPOINTMENT (OUTPATIENT)
Facility: HOSPITAL | Age: 89
DRG: 193 | End: 2025-03-11
Payer: MEDICARE

## 2025-03-11 LAB
ANION GAP SERPL CALC-SCNC: 11 MMOL/L (ref 2–12)
BASOPHILS # BLD: 0.03 K/UL (ref 0–0.1)
BASOPHILS NFR BLD: 0.5 % (ref 0–1)
BUN SERPL-MCNC: 24 MG/DL (ref 6–20)
BUN/CREAT SERPL: 32 (ref 12–20)
CALCIUM SERPL-MCNC: 8.2 MG/DL (ref 8.5–10.1)
CHLORIDE SERPL-SCNC: 101 MMOL/L (ref 97–108)
CO2 SERPL-SCNC: 22 MMOL/L (ref 21–32)
CREAT SERPL-MCNC: 0.74 MG/DL (ref 0.55–1.02)
DIFFERENTIAL METHOD BLD: ABNORMAL
EKG ATRIAL RATE: 89 BPM
EKG DIAGNOSIS: NORMAL
EKG P AXIS: 74 DEGREES
EKG P-R INTERVAL: 172 MS
EKG Q-T INTERVAL: 340 MS
EKG QRS DURATION: 100 MS
EKG QTC CALCULATION (BAZETT): 413 MS
EKG R AXIS: -11 DEGREES
EKG T AXIS: 61 DEGREES
EKG VENTRICULAR RATE: 89 BPM
EOSINOPHIL # BLD: 0.04 K/UL (ref 0–0.4)
EOSINOPHIL NFR BLD: 0.7 % (ref 0–7)
ERYTHROCYTE [DISTWIDTH] IN BLOOD BY AUTOMATED COUNT: 11.9 % (ref 11.5–14.5)
GLUCOSE SERPL-MCNC: 92 MG/DL (ref 65–100)
HCT VFR BLD AUTO: 30.3 % (ref 35–47)
HGB BLD-MCNC: 10.4 G/DL (ref 11.5–16)
IMM GRANULOCYTES # BLD AUTO: 0.1 K/UL (ref 0–0.04)
IMM GRANULOCYTES NFR BLD AUTO: 1.8 % (ref 0–0.5)
LIPASE SERPL-CCNC: 27 U/L (ref 13–75)
LYMPHOCYTES # BLD: 1.12 K/UL (ref 0.8–3.5)
LYMPHOCYTES NFR BLD: 19.7 % (ref 12–49)
MCH RBC QN AUTO: 35.1 PG (ref 26–34)
MCHC RBC AUTO-ENTMCNC: 34.3 G/DL (ref 30–36.5)
MCV RBC AUTO: 102.4 FL (ref 80–99)
MONOCYTES # BLD: 0.55 K/UL (ref 0–1)
MONOCYTES NFR BLD: 9.7 % (ref 5–13)
NEUTS SEG # BLD: 3.85 K/UL (ref 1.8–8)
NEUTS SEG NFR BLD: 67.6 % (ref 32–75)
NRBC # BLD: 0 K/UL (ref 0–0.01)
NRBC BLD-RTO: 0 PER 100 WBC
NT PRO BNP: 1227 PG/ML
NT PRO BNP: 1418 PG/ML
PLATELET # BLD AUTO: 291 K/UL (ref 150–400)
PMV BLD AUTO: 9.8 FL (ref 8.9–12.9)
POTASSIUM SERPL-SCNC: 4 MMOL/L (ref 3.5–5.1)
RBC # BLD AUTO: 2.96 M/UL (ref 3.8–5.2)
SODIUM SERPL-SCNC: 134 MMOL/L (ref 136–145)
WBC # BLD AUTO: 5.7 K/UL (ref 3.6–11)

## 2025-03-11 PROCEDURE — 6370000000 HC RX 637 (ALT 250 FOR IP): Performed by: HOSPITALIST

## 2025-03-11 PROCEDURE — 85025 COMPLETE CBC W/AUTO DIFF WBC: CPT

## 2025-03-11 PROCEDURE — 6370000000 HC RX 637 (ALT 250 FOR IP): Performed by: FAMILY MEDICINE

## 2025-03-11 PROCEDURE — 2500000003 HC RX 250 WO HCPCS: Performed by: FAMILY MEDICINE

## 2025-03-11 PROCEDURE — 80048 BASIC METABOLIC PNL TOTAL CA: CPT

## 2025-03-11 PROCEDURE — 94761 N-INVAS EAR/PLS OXIMETRY MLT: CPT

## 2025-03-11 PROCEDURE — 83690 ASSAY OF LIPASE: CPT

## 2025-03-11 PROCEDURE — 6370000000 HC RX 637 (ALT 250 FOR IP): Performed by: INTERNAL MEDICINE

## 2025-03-11 PROCEDURE — 2700000000 HC OXYGEN THERAPY PER DAY

## 2025-03-11 PROCEDURE — 6360000002 HC RX W HCPCS: Performed by: FAMILY MEDICINE

## 2025-03-11 PROCEDURE — 93010 ELECTROCARDIOGRAM REPORT: CPT | Performed by: SPECIALIST

## 2025-03-11 PROCEDURE — 6360000002 HC RX W HCPCS: Performed by: NURSE PRACTITIONER

## 2025-03-11 PROCEDURE — 6360000002 HC RX W HCPCS: Performed by: HOSPITALIST

## 2025-03-11 PROCEDURE — 2580000003 HC RX 258: Performed by: INTERNAL MEDICINE

## 2025-03-11 PROCEDURE — 51798 US URINE CAPACITY MEASURE: CPT

## 2025-03-11 PROCEDURE — 1100000000 HC RM PRIVATE

## 2025-03-11 PROCEDURE — 2500000003 HC RX 250 WO HCPCS: Performed by: HOSPITALIST

## 2025-03-11 PROCEDURE — 74177 CT ABD & PELVIS W/CONTRAST: CPT

## 2025-03-11 PROCEDURE — 2580000003 HC RX 258: Performed by: HOSPITALIST

## 2025-03-11 PROCEDURE — 83880 ASSAY OF NATRIURETIC PEPTIDE: CPT

## 2025-03-11 PROCEDURE — 6360000004 HC RX CONTRAST MEDICATION: Performed by: INTERNAL MEDICINE

## 2025-03-11 RX ORDER — ASPIRIN 81 MG/1
81 TABLET, CHEWABLE ORAL DAILY
Status: DISCONTINUED | OUTPATIENT
Start: 2025-03-11 | End: 2025-03-12 | Stop reason: HOSPADM

## 2025-03-11 RX ORDER — SODIUM CHLORIDE, SODIUM LACTATE, POTASSIUM CHLORIDE, CALCIUM CHLORIDE 600; 310; 30; 20 MG/100ML; MG/100ML; MG/100ML; MG/100ML
INJECTION, SOLUTION INTRAVENOUS CONTINUOUS
Status: DISCONTINUED | OUTPATIENT
Start: 2025-03-11 | End: 2025-03-12

## 2025-03-11 RX ORDER — IOPAMIDOL 755 MG/ML
100 INJECTION, SOLUTION INTRAVASCULAR
Status: COMPLETED | OUTPATIENT
Start: 2025-03-11 | End: 2025-03-11

## 2025-03-11 RX ADMIN — BACLOFEN 5 MG: 10 TABLET ORAL at 12:43

## 2025-03-11 RX ADMIN — HYDRALAZINE HYDROCHLORIDE 10 MG: 20 INJECTION INTRAMUSCULAR; INTRAVENOUS at 03:55

## 2025-03-11 RX ADMIN — CARBAMAZEPINE 600 MG: 200 TABLET ORAL at 21:09

## 2025-03-11 RX ADMIN — DOXYCYCLINE HYCLATE 100 MG: 100 TABLET, COATED ORAL at 12:42

## 2025-03-11 RX ADMIN — LOSARTAN POTASSIUM 100 MG: 50 TABLET, FILM COATED ORAL at 12:44

## 2025-03-11 RX ADMIN — SODIUM CHLORIDE: 0.9 INJECTION, SOLUTION INTRAVENOUS at 03:56

## 2025-03-11 RX ADMIN — AMLODIPINE BESYLATE 10 MG: 5 TABLET ORAL at 12:42

## 2025-03-11 RX ADMIN — SODIUM CHLORIDE, PRESERVATIVE FREE 10 ML: 5 INJECTION INTRAVENOUS at 12:45

## 2025-03-11 RX ADMIN — CARBAMAZEPINE 600 MG: 200 TABLET ORAL at 12:41

## 2025-03-11 RX ADMIN — WATER 1000 MG: 1 INJECTION INTRAMUSCULAR; INTRAVENOUS; SUBCUTANEOUS at 12:37

## 2025-03-11 RX ADMIN — Medication 8 MG: at 21:10

## 2025-03-11 RX ADMIN — ASPIRIN 81 MG: 81 TABLET, CHEWABLE ORAL at 15:37

## 2025-03-11 RX ADMIN — Medication 3 MG: at 21:10

## 2025-03-11 RX ADMIN — Medication 8 MG: at 12:37

## 2025-03-11 RX ADMIN — ONDANSETRON 4 MG: 2 INJECTION, SOLUTION INTRAMUSCULAR; INTRAVENOUS at 15:04

## 2025-03-11 RX ADMIN — IOPAMIDOL 100 ML: 755 INJECTION, SOLUTION INTRAVENOUS at 16:50

## 2025-03-11 RX ADMIN — HYDRALAZINE HYDROCHLORIDE 25 MG: 25 TABLET ORAL at 15:05

## 2025-03-11 RX ADMIN — HYDRALAZINE HYDROCHLORIDE 25 MG: 25 TABLET ORAL at 21:09

## 2025-03-11 RX ADMIN — SODIUM CHLORIDE, POTASSIUM CHLORIDE, SODIUM LACTATE AND CALCIUM CHLORIDE: 600; 310; 30; 20 INJECTION, SOLUTION INTRAVENOUS at 15:39

## 2025-03-11 RX ADMIN — ENOXAPARIN SODIUM 40 MG: 100 INJECTION SUBCUTANEOUS at 21:09

## 2025-03-11 RX ADMIN — SODIUM CHLORIDE, PRESERVATIVE FREE 10 ML: 5 INJECTION INTRAVENOUS at 21:10

## 2025-03-11 RX ADMIN — SERTRALINE 25 MG: 50 TABLET, FILM COATED ORAL at 12:44

## 2025-03-11 RX ADMIN — HYDRALAZINE HYDROCHLORIDE 25 MG: 25 TABLET ORAL at 05:37

## 2025-03-11 NOTE — PROGRESS NOTES
[facility]NA[facility]  [address] [place] [address] [phone]      Medical Progress Note      NAME: Makenzie Zepeda   :  1934  MRM:  [id number]    Date/Time: 3/11/2025  12:43 PM         Subjective:     Chief Complaint: \"I'm ok, leave me alone\"     Pt seen and examined. Initially did not want to be bothered but offered water and agreed. Then allowed for evaluation of her stomach then politely declined additional evaluation. Denies nausea. Per RN has not had N/V today.        Objective:       Vitals:     Last 24hrs VS reviewed since prior progress note. Most recent are:    Vitals:    25 1145   BP: (!) 144/52   Pulse: 82   Resp: 15   Temp: 98.6 °F (37 °C)   SpO2: 95%     SpO2 Readings from Last 6 Encounters:   25 95%   24 97%   24 96%          Intake/Output Summary (Last 24 hours) at 3/11/2025 1243  Last data filed at 3/11/2025 0400  Gross per 24 hour   Intake 120 ml   Output 0 ml   Net 120 ml            Exam:     Physical Exam: During rapid response    Gen: NAD   HEENT:  NC/AT.  Neck:  Supple.  Resp:  CTA B/L. No wheezing   Card:  RRR. No MRF   Abd:  Soft, non-tender, non-distended, normoactive bowel sounds.  Ext: No clubbing, cyanosis or edema.  Neuro: Awake but not alert.  Nonfocal generalized weakness in extremities.    Psych: moderate insight     Medications Reviewed: (see below)    Lab Data Reviewed: (see below)  ______________________________________________________________________    Medications:     Current Facility-Administered Medications   Medication Dose Route Frequency    aspirin suppository 300 mg  300 mg Rectal Daily    Betahistine 8 mg Capsule (Patient Supplied)  8 mg Oral BID    melatonin tablet 3 mg  3 mg Oral Nightly PRN    baclofen (LIORESAL) tablet 5 mg  5 mg Oral Daily    guaiFENesin-codeine (guaiFENesin AC) 100-10 MG/5ML liquid 5 mL  5 mL Oral Q4H PRN    carBAMazepine (TEGRETOL) tablet 600 mg  600 mg Oral BID    enoxaparin

## 2025-03-11 NOTE — PROGRESS NOTES
Physician Progress Note      PATIENT:               YUNG MEJIA  CSN #:                  [id number]  :                       1934  ADMIT DATE:       3/6/2025 8:37 AM  DISCH DATE:  RESPONDING  PROVIDER #:        Laly An MD          QUERY TEXT:    Good morning.    Patient admitted with pneumonia. Noted documentation of acute respiratory   failure in  ED provider note and  & 03/10 IM Progress Notes.    In order to support the diagnosis of acute hypoxic respiratory failure, please   include additional clinical indicators in your documentation.  Or please   document if the diagnosis of acute hypoxic respiratory failure has been ruled   out after further study.    The medical record reflects the following:    Risk Factors: 89 yr old female, HTN, memory loss, chronic macrocytic anemia,   acute illness    Clinical Indicators: from  ED Provider note: \"Acute hypoxic respiratory   failure\" and \"Respiratory:  Positive for cough. Negative for chest   tightness.\"; from  and 03/10 IM PN: \" Acute hypoxic respiratory failure\"   and \"Resp:  Unlabored breathing. Clear breath sounds with good air entry. No   obvious crackles\";  VS 07:01 HR 85, RR 20, RA SpO2 91%;  VS 14:01 HR   86, RR 28, RA SpO2 90%;  VS 13:06 HR 84, RA SpO2 87%;  VS 16:01 HR   77, RR 15, SpO2 96%; 03/10 Blood gas: 03/10/25 14:31Base Deficit: 1.3, DEVICE:   CPAP Site: LEFT BRACHIAL POC pH: 7.41, POC pCO2: 36.6, POC PO2: 84, POC HCO3:   23.1, POC O2 SAT: 96.4, FIO2: 2;  Chest XR: Suspected pulmonary edema   superimposed on chronic lung disease with a small left effusion;  Chest   XR: Cardiomegaly and mild scattered interstitial opacities, pulmonary edema   versus multifocal pneumonia; 03/10 Chest XR: Increased bilateral airspace   disease which may represent aspiration/pneumonia;  Chest CTA: No   pulmonary embolism. 2.  Widespread upper lobe predominant patchy bilateral   groundglass airspace  disease. Follow-up chest CT in 3 months can be obtained   to document resolution.    Treatment: VS per unit protocol, Supplemental O2, Blood Gas, Chest Xrays,   Chest CTA    Thank you,  Kassie Stevens RN, CDI  Options provided:  -- Acute Hypoxic Respiratory Failure, present on admission, as evidenced by,   Please document evidence.  -- Acute Hypoxic Respiratory Failure, not present on admission, as evidenced   by, Please document evidence.  -- Acute Hypoxic Respiratory Failure ruled out after study. Hypoxia without   acute respiratory failure.  -- Other - I will add my own diagnosis  -- Disagree - Not applicable / Not valid  -- Disagree - Clinically unable to determine / Unknown  -- Refer to Clinical Documentation Reviewer    PROVIDER RESPONSE TEXT:    Acute Hypoxic Respiratory Failure has been ruled out after study. This patient   has hypoxia without acute respiratory failure.    Query created by: Kassie Stevens on 3/11/2025 10:03 AM      Electronically signed by:  Laly An MD 3/11/2025 12:43 PM

## 2025-03-11 NOTE — PROGRESS NOTES
Chart reviewed and received clearance from nursing.  Pt refused to participate with therapy and wanted to rest.  Will con't to follow.

## 2025-03-11 NOTE — PROGRESS NOTES
Rapid Called at 2242    Responded to RRT at 2245 for dizziness after MRI. Pt c/o nausea. Zofran given. VSS. Alert oriented x2. Able to follow commands. BUE  equal. No loss of sensation. NO CP or SOB.    Provider at bedside: NO  Interventions ordered:   Transfer to Higher Level of Care: na  Blood Glucose: 106       Vitals:    03/10/25 2351   BP: (!) 156/56   Pulse: 85   Resp: 15   Temp: 98.4 °F (36.9 °C)   SpO2: 95%        Rapid Ended at 2305  RRT RN assisted with transport to accepting unit NA.    Lino Rico RN

## 2025-03-11 NOTE — PROGRESS NOTES
2242: RRT called d/t patient spinning head round and round saying she does not fee; good. Pt had just come back from MRI. Complaints of severe dizziness and nausea. Pt's mentation still the same from prior assessment. No c/o SOB or chest pain. Zofran given. Pt felt better after. Will continue to assess and monitor.

## 2025-03-11 NOTE — CARE COORDINATION
Care Management Progress Note    Reason for Admission:   Pneumonia due to other specified bacteria (HCC) [J15.8]  Acute hypoxic respiratory failure (HCC) [J96.01]         Patient Admission Status: Inpatient  RUR:  13%; LOS 4 days  Hospitalization in the last 30 days (Readmission):  No        Reportedly, pt resides alone.  Pt has private duty 3-4 days per week    Transition Plan of Care:  PT/OT/ST evals complete; pt ambulated 5 feet on 3/7 and SNF was recommended  SNF - referral was sent to [facility] for review  Outpatient follow up  Mode of transport to be determined     CM will continue to follow pt for definitive discharge plans.  Alyssa

## 2025-03-11 NOTE — PLAN OF CARE
Problem: Safety - Adult  Goal: Free from fall injury  Outcome: Progressing     Problem: Skin/Tissue Integrity  Goal: Skin integrity remains intact  Description: 1.  Monitor for areas of redness and/or skin breakdown  2.  Assess vascular access sites hourly  3.  Every 4-6 hours minimum:  Change oxygen saturation probe site  4.  Every 4-6 hours:  If on nasal continuous positive airway pressure, respiratory therapy assess nares and determine need for appliance change or resting period  Outcome: Progressing  Flowsheets  Taken 3/11/2025 1421  Skin Integrity Remains Intact: Monitor for areas of redness and/or skin breakdown  Taken 3/11/2025 0745  Skin Integrity Remains Intact: Monitor for areas of redness and/or skin breakdown     Problem: ABCDS Injury Assessment  Goal: Absence of physical injury  Outcome: Progressing     Problem: Discharge Planning  Goal: Discharge to home or other facility with appropriate resources  Outcome: Progressing     Problem: Respiratory - Adult  Goal: Achieves optimal ventilation and oxygenation  Outcome: Progressing     Problem: Infection - Adult  Goal: Absence of infection at discharge  Outcome: Progressing

## 2025-03-12 ENCOUNTER — APPOINTMENT (OUTPATIENT)
Facility: HOSPITAL | Age: 89
DRG: 193 | End: 2025-03-12
Payer: MEDICARE

## 2025-03-12 VITALS
HEIGHT: 64 IN | DIASTOLIC BLOOD PRESSURE: 63 MMHG | OXYGEN SATURATION: 92 % | RESPIRATION RATE: 16 BRPM | TEMPERATURE: 98.8 F | WEIGHT: 125 LBS | SYSTOLIC BLOOD PRESSURE: 158 MMHG | BODY MASS INDEX: 21.34 KG/M2 | HEART RATE: 87 BPM

## 2025-03-12 LAB
ANION GAP SERPL CALC-SCNC: 9 MMOL/L (ref 2–12)
BASOPHILS # BLD: 0.04 K/UL (ref 0–0.1)
BASOPHILS NFR BLD: 0.8 % (ref 0–1)
BUN SERPL-MCNC: 25 MG/DL (ref 6–20)
BUN/CREAT SERPL: 33 (ref 12–20)
CALCIUM SERPL-MCNC: 8.6 MG/DL (ref 8.5–10.1)
CHLORIDE SERPL-SCNC: 105 MMOL/L (ref 97–108)
CO2 SERPL-SCNC: 23 MMOL/L (ref 21–32)
CREAT SERPL-MCNC: 0.75 MG/DL (ref 0.55–1.02)
DIFFERENTIAL METHOD BLD: ABNORMAL
EOSINOPHIL # BLD: 0.09 K/UL (ref 0–0.4)
EOSINOPHIL NFR BLD: 1.8 % (ref 0–7)
ERYTHROCYTE [DISTWIDTH] IN BLOOD BY AUTOMATED COUNT: 11.9 % (ref 11.5–14.5)
GLUCOSE SERPL-MCNC: 99 MG/DL (ref 65–100)
HCT VFR BLD AUTO: 29 % (ref 35–47)
HGB BLD-MCNC: 10.3 G/DL (ref 11.5–16)
IMM GRANULOCYTES # BLD AUTO: 0.09 K/UL (ref 0–0.04)
IMM GRANULOCYTES NFR BLD AUTO: 1.8 % (ref 0–0.5)
LYMPHOCYTES # BLD: 1.38 K/UL (ref 0.8–3.5)
LYMPHOCYTES NFR BLD: 28.3 % (ref 12–49)
MAGNESIUM SERPL-MCNC: 2 MG/DL (ref 1.6–2.4)
MCH RBC QN AUTO: 36 PG (ref 26–34)
MCHC RBC AUTO-ENTMCNC: 35.5 G/DL (ref 30–36.5)
MCV RBC AUTO: 101.4 FL (ref 80–99)
MONOCYTES # BLD: 0.47 K/UL (ref 0–1)
MONOCYTES NFR BLD: 9.7 % (ref 5–13)
NEUTS SEG # BLD: 2.8 K/UL (ref 1.8–8)
NEUTS SEG NFR BLD: 57.6 % (ref 32–75)
NRBC # BLD: 0 K/UL (ref 0–0.01)
NRBC BLD-RTO: 0 PER 100 WBC
NT PRO BNP: 1098 PG/ML
PLATELET # BLD AUTO: 315 K/UL (ref 150–400)
PMV BLD AUTO: 9.5 FL (ref 8.9–12.9)
POTASSIUM SERPL-SCNC: 4 MMOL/L (ref 3.5–5.1)
PROCALCITONIN SERPL-MCNC: 0.4 NG/ML
RBC # BLD AUTO: 2.86 M/UL (ref 3.8–5.2)
SARS-COV-2 RNA RESP QL NAA+PROBE: NOT DETECTED
SODIUM SERPL-SCNC: 137 MMOL/L (ref 136–145)
SOURCE: NORMAL
WBC # BLD AUTO: 4.9 K/UL (ref 3.6–11)

## 2025-03-12 PROCEDURE — 6370000000 HC RX 637 (ALT 250 FOR IP): Performed by: INTERNAL MEDICINE

## 2025-03-12 PROCEDURE — 87635 SARS-COV-2 COVID-19 AMP PRB: CPT

## 2025-03-12 PROCEDURE — 85025 COMPLETE CBC W/AUTO DIFF WBC: CPT

## 2025-03-12 PROCEDURE — 6370000000 HC RX 637 (ALT 250 FOR IP): Performed by: HOSPITALIST

## 2025-03-12 PROCEDURE — 36415 COLL VENOUS BLD VENIPUNCTURE: CPT

## 2025-03-12 PROCEDURE — 80048 BASIC METABOLIC PNL TOTAL CA: CPT

## 2025-03-12 PROCEDURE — 6370000000 HC RX 637 (ALT 250 FOR IP): Performed by: FAMILY MEDICINE

## 2025-03-12 PROCEDURE — 84145 PROCALCITONIN (PCT): CPT

## 2025-03-12 PROCEDURE — 94761 N-INVAS EAR/PLS OXIMETRY MLT: CPT

## 2025-03-12 PROCEDURE — 83735 ASSAY OF MAGNESIUM: CPT

## 2025-03-12 PROCEDURE — 71045 X-RAY EXAM CHEST 1 VIEW: CPT

## 2025-03-12 PROCEDURE — 97535 SELF CARE MNGMENT TRAINING: CPT

## 2025-03-12 PROCEDURE — 2500000003 HC RX 250 WO HCPCS: Performed by: FAMILY MEDICINE

## 2025-03-12 PROCEDURE — 97530 THERAPEUTIC ACTIVITIES: CPT

## 2025-03-12 PROCEDURE — 97116 GAIT TRAINING THERAPY: CPT

## 2025-03-12 PROCEDURE — 83880 ASSAY OF NATRIURETIC PEPTIDE: CPT

## 2025-03-12 RX ORDER — ONDANSETRON 4 MG/1
4 TABLET, ORALLY DISINTEGRATING ORAL 3 TIMES DAILY PRN
Qty: 21 TABLET | Refills: 0 | Status: SHIPPED | OUTPATIENT
Start: 2025-03-12

## 2025-03-12 RX ORDER — ACETAMINOPHEN 325 MG/1
650 TABLET ORAL ONCE
Status: COMPLETED | OUTPATIENT
Start: 2025-03-12 | End: 2025-03-12

## 2025-03-12 RX ADMIN — ACETAMINOPHEN 650 MG: 325 TABLET ORAL at 11:37

## 2025-03-12 RX ADMIN — HYDRALAZINE HYDROCHLORIDE 25 MG: 25 TABLET ORAL at 15:53

## 2025-03-12 RX ADMIN — SODIUM CHLORIDE, PRESERVATIVE FREE 10 ML: 5 INJECTION INTRAVENOUS at 10:22

## 2025-03-12 RX ADMIN — BACLOFEN 5 MG: 10 TABLET ORAL at 10:14

## 2025-03-12 RX ADMIN — LOSARTAN POTASSIUM 100 MG: 50 TABLET, FILM COATED ORAL at 10:14

## 2025-03-12 RX ADMIN — Medication 8 MG: at 10:14

## 2025-03-12 RX ADMIN — HYDRALAZINE HYDROCHLORIDE 25 MG: 25 TABLET ORAL at 05:25

## 2025-03-12 RX ADMIN — ASPIRIN 81 MG: 81 TABLET, CHEWABLE ORAL at 10:15

## 2025-03-12 RX ADMIN — AMLODIPINE BESYLATE 10 MG: 5 TABLET ORAL at 10:14

## 2025-03-12 RX ADMIN — CARBAMAZEPINE 600 MG: 200 TABLET ORAL at 10:14

## 2025-03-12 RX ADMIN — SERTRALINE 25 MG: 50 TABLET, FILM COATED ORAL at 10:15

## 2025-03-12 ASSESSMENT — PAIN SCALES - GENERAL: PAINLEVEL_OUTOF10: 5

## 2025-03-12 ASSESSMENT — PAIN DESCRIPTION - LOCATION: LOCATION: HIP

## 2025-03-12 NOTE — PLAN OF CARE
Problem: Physical Therapy - Adult  Goal: By Discharge: Performs mobility at highest level of function for planned discharge setting.  See evaluation for individualized goals.  Description: FUNCTIONAL STATUS PRIOR TO ADMISSION: Patient was independent and active without use of DME.    HOME SUPPORT PRIOR TO ADMISSION: The patient live in an independent living facility.    Physical Therapy Goals  Initiated 3/7/2025  1.  Patient will move from supine to sit and sit to supine, scoot up and down, and roll side to side in bed with independence within 7 day(s).    2.  Patient will perform sit to stand with independence within 7 day(s).  3.  Patient will transfer from bed to chair and chair to bed with independence using the least restrictive device within 7 day(s).  4.  Patient will ambulate with independence for 100 feet with the least restrictive device within 7 day(s).     Outcome: Progressing   PHYSICAL THERAPY TREATMENT    Patient: Makenzie Zepeda (89 y.o. female)  Date: 3/12/2025  Diagnosis: Pneumonia due to other specified bacteria ([facility]) [J15.8]  Acute hypoxic respiratory failure (HCC) [J96.01] Pneumonia due to other specified bacteria (HCC)      Precautions:              ASSESSMENT:  Patient continues to benefit from skilled PT services and is progressing towards goals. Pt received sitting up in chair with dtr from out of town visiting.  Pt disoriented to situation, yet cooperative.  Requesting to go back to bed after PT tx.  Sit to stand needing Mod A x2 due to LE and overall weakness.  Performed seated LAQ's x5 each.  Pt stood ~2min for donning of brief due to urinary incontinence needing to sit due to fatigue.  Gait of 50' with Mod Ax1 with RW is slow yet stable.  Pt placed in bed sit to supine with Min A and positioned for comfort.         PLAN:  Patient continues to benefit from skilled intervention to address the above impairments.  Continue treatment per established plan of care.    Recommendations for staff      Pain Ratin/10   Pain Intervention(s):   nursing notified, nursing notified and addressing, and repositioning    Activity Tolerance:   Fair     After treatment:   Patient left in no apparent distress in bed, Call bell within reach, Bed/ chair alarm activated, and Side rails x3      COMMUNICATION/EDUCATION:   The patient's plan of care was discussed with: occupational therapist, registered nurse, and     Patient Education  Education Given To: Patient;Family  Education Provided: Role of Therapy;Plan of Care;Mobility Training;Transfer Training;Family Education;Fall Prevention Strategies  Education Method: Verbal  Barriers to Learning: Cognition  Education Outcome: Continued education needed;Demonstrated understanding      SMITA SALINAS PT  Minutes: 23

## 2025-03-12 NOTE — CARE COORDINATION
Transition of Care Plan to SNF/Rehab    Pt has been accepted to [facility] and a skilled bed is available today.  RN, please call report to [phone].  Pt will go to room 408P.  Family will transport pt at 5:30 p.m.    Communication to Patient/Family:   Met with patient and family and they are agreeable to the transition plan. The Plan for Transition of Care is related to the following treatment goals: Pneumonia due to other specified bacteria (HCC) [J15.8]  Acute hypoxic respiratory failure (HCC) [J96.01]      The Patient and/or patient representative was provided with a choice of provider and agrees  with the discharge plan.      Yes [x] No []    A Freedom of choice list was provided with basic dialogue that supports the patient's individualized plan of care/goals and shares the quality data associated with the providers.       Yes [x] No []    SNF/Rehab Transition:  Patient has been accepted to [place] and meets criteria for admission.       Medicare 3 night stay satisfied? [x]   Inpatient Admission Dates: 3/6 - 3/12    Patient will be transported by  family and expected to leave at 5:30 p.m..   [x] Packet on chart (if needed)  [] PCS completed (if applicable)     Communication to SNF/Rehab:  Bedside RN, Janet, has been notified to update the transition plan to the facility and call report (phone number).  Discharge information has been updated on the AVS. And communicated to facility via [employer]/All [employer], or CC link.     Discharge instructions to be fax'd to facility via [x] AllScripts [] CCLink      Nursing Please include all hard scripts for controlled substances, med rec and dc summary, and AVS in packet.       Nursing, please discuss the following applicable information with the facility's nursing during report:     Felix with (X) only those applicable:  Medication:  []Medications are available at the facility  []IV Antibiotics    []Controlled Substance - hard  copies available sent.  []Weekly Labs    Equipment:  []CPAP/BiPAP  []Wound Vacuum  [][place] or Urinary Device  []PICC/Central Line  []Nebulizer  []Ventilator    Treatment:  []Isolation (for MRSA, VRE, etc.)  []Surgical Drain Management  []Tracheostomy Care  []Dressing Changes  []Dialysis with transportation  []PEG Care  []Oxygen  []Daily Weights for Heart Failure    Dietary:  []Any diet limitations  []Tube Feedings   []Total Parenteral Management (TPN)    Financial Resources:    []UAI Completed and copy given to facility or patient/family.     []A screening has previously been completed by external provider; therefore, facility must request UAI directly from external provider.    [] Private pay individual who will not become   financially eligible for Medicaid within 6 months from admission to a [facility].     [] Individual refused to have screening conducted, or patient reports intent to return home following rehab.       Advanced Care Plan:  []Surrogate Decision Maker of Care  []POA  []Communicated Code Status and copy sent.    Other: Rapid Covid was negative on 3/12       Alyssa

## 2025-03-12 NOTE — PLAN OF CARE
Problem: Occupational Therapy - Adult  Goal: By Discharge: Performs self-care activities at highest level of function for planned discharge setting.  See evaluation for individualized goals.  Description: FUNCTIONAL STATUS PRIOR TO ADMISSION:  Patient reports independent with ADLs, able to ambulate with no AD, but uses a rolling walker when she can find.  Patient poor historian during assessment.   ,  ,  ,  ,  ,  ,  ,  ,  ,  ,       HOME SUPPORT: Patient lived in Independent living facility.    Occupational Therapy Goals:  Initiated 3/7/2025  1.  Patient will perform lower body dressing with Supervision within 7 day(s).  2.  Patient will perform grooming with Supervision within 7 day(s).  3.  Patient will perform toilet transfers with Supervision  within 7 day(s).  4.  Patient will perform all aspects of toileting with Supervision within 7 day(s).  5.  Patient will participate in upper extremity therapeutic exercise/activities with Supervision for 10 minutes within 7 day(s).    6.  Patient will utilize energy conservation techniques during functional activities with verbal cues within 7 day(s).    Outcome: Progressing   OCCUPATIONAL THERAPY TREATMENT  Patient: Makenzie Zepeda (89 y.o. female)  Date: 3/12/2025  Primary Diagnosis: Pneumonia due to other specified bacteria (HCC) [J15.8]  Acute hypoxic respiratory failure (HCC) [J96.01]       Precautions:                  Chart, occupational therapy assessment, plan of care, and goals were reviewed.  ASSESSMENT  Patient continues to benefit from skilled OT services and is progressing towards goals. Patient received in chair, agreeable to activity but requesting return to bed.  Patient requires mod x 2 assist for sit to stand from chair.  Noted LE weakness but able to  remain standing for LE dressing/hygiene with protective brief change.  She tolerated standing x 2 minutes prior to need for seated rest.  Short distance ambulation in room prior to return to bed.  Min x 1  Moderate assistance                               Product Used : Bath wipes        Pain Rating:  Does not report/10   Pain Intervention(s):       Activity Tolerance:   requires rest breaks  Please refer to the flowsheet for vital signs taken during this treatment.    After treatment:   Patient left in no apparent distress in bed, Call bell within reach, Bed/ chair alarm activated, Caregiver / family present, and Side rails x3    COMMUNICATION/EDUCATION:   The patient's plan of care was discussed with: physical therapist and registered nurse         Thank you for this referral.  Karuna Witt OTR/L  Minutes: 25

## 2025-03-12 NOTE — DISCHARGE SUMMARY
Physician Discharge Summary     Patient ID:  Makenzie Zepeda  [id number]  89 y.o.  12/13/1934    Admit date: 3/6/2025    Discharge date and time: 3/12/2025    Admission Diagnoses: Pneumonia due to other specified bacteria (HCC) [J15.8]    Discharge Diagnoses/Hospital Course   89 years old female who presented with symptoms of fatigue and cough, admitted with bilateral community-acquired pneumonia.     Metabolic encephalopathy - 2/2 head CT, MRI without e/o CVA. TSH, B12, ammonia WNL. Head CT and MRI neg. Do suspect delirium in house on ?MCI     Community-acquired pneumonia - CT chest obtained on admission with bilateral upper lobe airspace disease. S/p Cef/doxy      Hyponatremia - improved off HCTZ      Hypokalemia - resolved      OPAL, POA - resolved      Essential hypertension - on amlodipine, hydralazine, ARB      Memory loss - see above; did have delirium in house      Chronic macrocytic anemia - mild. H+H 10.3. B12 WNL      Nausea/vomiting - seemed to temporally relate to antibiotics. Lipase WNL. Not constipated. CT without intra-abdominal pathology. Resolved.     PCP: Joni Roberts MD     Consults: none    Discharge Exam:  Vitals:    03/12/25 1205   BP: 136/84   Pulse: 88   Resp:    Temp:    SpO2: 95%      Gen: NAD   HEENT:  NC/AT.  Neck:  Supple.  Resp:  CTA B/L. No wheezing   Card:  RRR. No MRF   Abd:  Soft, non-tender, non-distended, normoactive bowel sounds.  Ext: No clubbing, cyanosis or edema.  Neuro: Awake but not alert.  Nonfocal generalized weakness in extremities.    Psych: moderate insight     Disposition: SNF    Patient Instructions:   Current Discharge Medication List        START taking these medications    Details   melatonin 3 MG TABS tablet Take 1 tablet by mouth nightly  Qty: 30 tablet, Refills: 0           CONTINUE these medications which have CHANGED    Details   ondansetron (ZOFRAN-ODT) 4 MG disintegrating tablet Take 1 tablet by mouth 3 times daily as needed for Nausea or Vomiting  Qty: 21

## 2025-03-12 NOTE — DISCHARGE INSTRUCTIONS
HOSPITALIST DISCHARGE INSTRUCTIONS  NAME:  Makenzie Zepeda   :  1934   MRN:  [id number]     Date/Time:  3/12/2025 1:57 PM    ADMIT DATE: 3/6/2025     DISCHARGE DATE: 3/12/2025     DISCHARGE DIAGNOSIS:  Pneumonia     DISCHARGE INSTRUCTIONS:  Thank you for allowing us to participate in your care. Your discharging Hospitalist is Laly An MD. You were admitted for evaluation and treatment of the above.       MEDICATIONS:    It is important that you take the medication exactly as they are prescribed.   Keep your medication in the bottles provided by the pharmacist and keep a list of the medication names, dosages, and times to be taken in your wallet.   Do not take other medications without consulting your doctor.             If you experience any of the following symptoms then please call your primary care physician or return to the emergency room if you cannot get hold of your doctor:  Fever, chills, nausea, vomiting, diarrhea, change in mentation, falling, bleeding, shortness of breath    Follow Up:  Please call the below provider to arrange hospital follow up appointment      Pt's home med - Betahistine 8mg cap    Follow up  Please return Pt's home med - Betahistine 8mg cap upon discharge. thanks    Please follow-up with your PCP as needed     For questions regarding your Hospitalization or to contact the Hospital Medicine team, please call [phone].      Information obtained by :  I understand that if any problems occur once I am at home I am to contact my physician.    I understand and acknowledge receipt of the instructions indicated above.                                                                                                                                           Physician's or R.N.'s Signature                                                                  Date/Time                                                                                                                                               Patient or Representative Signature                                                          Date/Time

## 2025-03-12 NOTE — PROGRESS NOTES
[facility]  [address] [place] [address] [phone]      Medical Progress Note      NAME: Makenzie Zepeda   :  1934  MRM:  [id number]    Date/Time: 3/12/2025  12:56 PM         Subjective:     Chief Complaint: \"I'm leaving today\"     Pt seen and examined. No complaints. States her room was too cold overnight despite thermostat being increased and so her chronic hip pain is present. Amenable to tylenol. Drank coffee this AM        Objective:       Vitals:     Last 24hrs VS reviewed since prior progress note. Most recent are:    Vitals:    25 1205   BP: 136/84   Pulse: 88   Resp:    Temp:    SpO2: 95%     SpO2 Readings from Last 6 Encounters:   25 95%   24 97%   24 96%          Intake/Output Summary (Last 24 hours) at 3/12/2025 1256  Last data filed at 3/12/2025 0524  Gross per 24 hour   Intake 360 ml   Output 475 ml   Net -115 ml            Exam:     Physical Exam: During rapid response    Gen: NAD   HEENT:  NC/AT.  Neck:  Supple.  Resp:  CTA B/L. No wheezing   Card:  RRR. No MRF   Abd:  Soft, non-tender, non-distended, normoactive bowel sounds.  Ext: No clubbing, cyanosis or edema.  Neuro: Awake but not alert.  Nonfocal generalized weakness in extremities.    Psych: moderate insight     Medications Reviewed: (see below)    Lab Data Reviewed: (see below)  ______________________________________________________________________    Medications:     Current Facility-Administered Medications   Medication Dose Route Frequency    melatonin tablet 3 mg  3 mg Oral QHS    aspirin chewable tablet 81 mg  81 mg Oral Daily    Betahistine 8 mg Capsule (Patient Supplied)  8 mg Oral BID    baclofen (LIORESAL) tablet 5 mg  5 mg Oral Daily    guaiFENesin-codeine (guaiFENesin AC) 100-10 MG/5ML liquid 5 mL  5 mL Oral Q4H PRN    carBAMazepine (TEGRETOL) tablet 600 mg  600 mg Oral BID    enoxaparin (LOVENOX) injection 40 mg  40 mg SubCUTAneous Q24H    ipratropium      Essential hypertension - BP elevated but did not take AM meds yet  -on amlodipine, hydralazine, ARB     Memory loss  -supportive care; at risk for delirium   -on melatonin qHS     Chronic macrocytic anemia  -B12 WNL     Nausea/vomiting - historically seems to temporally relate to antibiotics  -has completed antibiotics  -lipase WNL  -CT without intra-ab pathology              Care Plan discussed with: Patient, daughter     Prophylaxis:  Lovenox    Disposition: SNF pending tolerating breakfast and lunch            ___________________________________________________    Attending Physician: Laly An MD

## 2025-03-12 NOTE — PLAN OF CARE
Problem: Safety - Adult  Goal: Free from fall injury  3/11/2025 2239 by Bebe Gupta RN  Outcome: Progressing  3/11/2025 1846 by Stanford Gallardo RN  Outcome: Progressing     Problem: Skin/Tissue Integrity  Goal: Skin integrity remains intact  Description: 1.  Monitor for areas of redness and/or skin breakdown  2.  Assess vascular access sites hourly  3.  Every 4-6 hours minimum:  Change oxygen saturation probe site  4.  Every 4-6 hours:  If on nasal continuous positive airway pressure, respiratory therapy assess nares and determine need for appliance change or resting period  3/11/2025 2239 by Bebe Gupta RN  Outcome: Progressing  3/11/2025 1846 by Stanford Gallardo RN  Outcome: Progressing  Flowsheets  Taken 3/11/2025 1421  Skin Integrity Remains Intact: Monitor for areas of redness and/or skin breakdown  Taken 3/11/2025 0745  Skin Integrity Remains Intact: Monitor for areas of redness and/or skin breakdown     Problem: ABCDS Injury Assessment  Goal: Absence of physical injury  3/11/2025 2239 by Bebe Gupta RN  Outcome: Progressing  3/11/2025 1846 by Stanford Gallardo RN  Outcome: Progressing     Problem: Discharge Planning  Goal: Discharge to home or other facility with appropriate resources  3/11/2025 2239 by Bebe Gupta RN  Outcome: Progressing  3/11/2025 1846 by Stanford Gallardo RN  Outcome: Progressing     Problem: Respiratory - Adult  Goal: Achieves optimal ventilation and oxygenation  3/11/2025 2239 by Bebe Gupta RN  Outcome: Progressing  3/11/2025 1846 by Stanford Gallardo RN  Outcome: Progressing     Problem: Infection - Adult  Goal: Absence of infection at discharge  3/11/2025 2239 by Bebe Gupta RN  Outcome: Progressing  3/11/2025 1846 by Stanford Gallardo RN  Outcome: Progressing

## 2025-03-13 LAB
L PNEUMO1 AG UR QL IA: NEGATIVE
SPECIMEN SOURCE: NORMAL

## 2025-03-21 NOTE — PROGRESS NOTES
Physician Progress Note      PATIENT:               YUNG MEJIA  CSN #:                  [id number]  :                       1934  ADMIT DATE:       3/6/2025 8:37 AM  DISCH DATE:        3/12/2025 5:45 PM  RESPONDING  PROVIDER #:        Laly An MD          QUERY TEXT:    Good morning.    Patient admitted with pneumonia. Noted documentation of acute metabolic   encephalopathy in on  ED provider note, IM PNs dated 03/10- and    DC summary.    In order to support the diagnosis of acute metabolic encephalopathy, please   include additional clinical indicators in your documentation.  Or please   document if the diagnosis of acute metabolic encephalopathy has been ruled out   after further study.    The medical record reflects the following:    Risk Factors: 89 yr old female, HTN, memory loss, anemia, acute illness    Clinical Indicators: From  ED Provider note: \"Biggest symptom seems to be   a cough with resulting metabolic encephalopathy\" and \"Neurological:  Positive   for weakness. Negative for numbness.\"; from 03/10 IM PN: \"Rapid response 3/10   for acute encephalopathy ? I immediately assessed patient at bedside for   acute encephalopathy.  I initially rounded on her this morning and she was   awake alert and oriented.  This is certainly an acute change.  Patient is awake, but not alert and oriented.  Nonfocal physical examination.   CT head obtained: I just discussed with teleneurology and CT head is negative.    At this point, teleneurology has high suspicion for likely metabolic causes   of encephalopathy in this 89 years old female, with no further aggressive   stroke workup recommended at this time.\" and \"Neuro: Awake but not alert.    Nonfocal generalized weakness in extremities.  Cranial nerves questionably   intact.  Coordination cannot be done at this time.  Gait not tested.\"; from    IM PN and  DC Summary: \"Metabolic encephalopathy - 2/2 head CT, MRI    without e/o CVA. ?delirum from hospitalization\" and \"Neuro: Awake but not   alert.  Nonfocal generalized weakness in extremities. Psych: moderate   insight\"; 03/11 Brain MRI: No evidence of acute ischemia; 03/10 Head CT: No   acute intracranial abnormality, 03/06/25 10:14  Sodium: 127    Treatment: Brain MRI, Head CT, labs, monitor      Thank you,  Kassie Stevens RN, CDI  Options provided:  -- Acute metabolic encephalopathy present as evidenced by, Please document   evidence.  -- Acute metabolic enpcephalopathy was ruled out  -- Other - I will add my own diagnosis  -- Disagree - Not applicable / Not valid  -- Disagree - Clinically unable to determine / Unknown  -- Refer to Clinical Documentation Reviewer    PROVIDER RESPONSE TEXT:    Acute metabolic encephalopathy was ruled out after study.    Query created by: Kassie Stevens on 3/17/2025 7:46 AM      Electronically signed by:  Laly An MD 3/21/2025 10:13 AM